# Patient Record
Sex: MALE | Race: WHITE | NOT HISPANIC OR LATINO | Employment: FULL TIME | URBAN - METROPOLITAN AREA
[De-identification: names, ages, dates, MRNs, and addresses within clinical notes are randomized per-mention and may not be internally consistent; named-entity substitution may affect disease eponyms.]

---

## 2023-06-19 PROBLEM — J01.90 ACUTE SINUSITIS: Status: ACTIVE | Noted: 2023-06-19

## 2023-06-19 PROBLEM — R93.89 ABNORMAL FINDING ON IMAGING: Status: ACTIVE | Noted: 2023-06-19

## 2023-06-19 PROBLEM — J06.9 VIRAL URI WITH COUGH: Status: RESOLVED | Noted: 2023-06-19 | Resolved: 2023-06-19

## 2023-06-19 PROBLEM — H91.93 BILATERAL HEARING LOSS: Status: ACTIVE | Noted: 2023-06-19

## 2023-06-19 PROBLEM — N52.9 ERECTILE DYSFUNCTION: Status: ACTIVE | Noted: 2023-06-19

## 2023-06-19 PROBLEM — G47.33 OBSTRUCTIVE SLEEP APNEA ON CPAP: Status: ACTIVE | Noted: 2023-06-19

## 2023-06-19 PROBLEM — K44.9 HIATAL HERNIA: Status: ACTIVE | Noted: 2023-06-19

## 2023-06-19 PROBLEM — E78.5 HYPERLIPEMIA: Status: ACTIVE | Noted: 2023-06-19

## 2023-06-19 PROBLEM — R53.83 FATIGUE: Status: ACTIVE | Noted: 2023-06-19

## 2023-06-19 PROBLEM — M25.50 POLYARTHRALGIA: Status: ACTIVE | Noted: 2023-06-19

## 2023-06-19 PROBLEM — M54.2 NECK PAIN: Status: ACTIVE | Noted: 2023-06-19

## 2023-06-19 PROBLEM — K64.8 PROLAPSED INTERNAL HEMORRHOIDS: Status: ACTIVE | Noted: 2023-06-19

## 2023-06-19 PROBLEM — Z00.00 ENCOUNTER FOR HEALTH MAINTENANCE EXAMINATION: Status: ACTIVE | Noted: 2023-06-19

## 2023-06-19 PROBLEM — L57.0 ACTINIC KERATOSIS: Status: ACTIVE | Noted: 2023-06-19

## 2023-06-19 PROBLEM — E55.9 VITAMIN D DEFICIENCY: Status: ACTIVE | Noted: 2023-06-19

## 2023-06-19 PROBLEM — K21.9 GERD (GASTROESOPHAGEAL REFLUX DISEASE): Status: ACTIVE | Noted: 2023-06-19

## 2023-06-19 PROBLEM — K20.90 ESOPHAGITIS: Status: ACTIVE | Noted: 2023-06-19

## 2023-06-19 PROBLEM — N40.0 BENIGN ENLARGEMENT OF PROSTATE: Status: ACTIVE | Noted: 2023-06-19

## 2023-06-19 PROBLEM — I10 HYPERTENSION: Status: ACTIVE | Noted: 2023-06-19

## 2023-06-19 PROBLEM — G43.109 OCULAR MIGRAINE: Status: ACTIVE | Noted: 2023-06-19

## 2023-06-19 PROBLEM — G47.00 INSOMNIA: Status: ACTIVE | Noted: 2023-06-19

## 2023-06-19 PROBLEM — H53.9 VISUAL DISTURBANCE: Status: ACTIVE | Noted: 2023-06-19

## 2023-06-19 PROBLEM — I48.91 A-FIB (MULTI): Status: ACTIVE | Noted: 2023-06-19

## 2023-06-19 PROBLEM — R10.13 ABDOMINAL PAIN, EPIGASTRIC: Status: ACTIVE | Noted: 2023-06-19

## 2023-06-19 PROBLEM — J30.89 ENVIRONMENTAL AND SEASONAL ALLERGIES: Status: ACTIVE | Noted: 2023-06-19

## 2023-06-19 PROBLEM — M47.816 DEGENERATIVE JOINT DISEASE (DJD) OF LUMBAR SPINE: Status: ACTIVE | Noted: 2023-06-19

## 2023-06-19 PROBLEM — K58.1 IRRITABLE BOWEL SYNDROME WITH PREDOMINANT CONSTIPATION: Status: ACTIVE | Noted: 2023-06-19

## 2023-06-19 PROBLEM — I48.20 ATRIAL FIBRILLATION, CHRONIC (MULTI): Status: ACTIVE | Noted: 2023-06-19

## 2023-06-19 PROBLEM — R41.3 MEMORY LOSS: Status: ACTIVE | Noted: 2023-06-19

## 2023-06-19 PROBLEM — Q37.9: Status: ACTIVE | Noted: 2023-06-19

## 2023-06-19 PROBLEM — H57.10 OCULAR PAIN: Status: ACTIVE | Noted: 2023-06-19

## 2023-06-19 PROBLEM — J30.9 ALLERGIC RHINITIS: Status: ACTIVE | Noted: 2023-06-19

## 2023-06-19 PROBLEM — R93.5 ABNORMAL CT OF THE ABDOMEN: Status: ACTIVE | Noted: 2023-06-19

## 2023-06-19 PROBLEM — I49.3 PVCS (PREMATURE VENTRICULAR CONTRACTIONS): Status: ACTIVE | Noted: 2023-06-19

## 2023-06-19 PROBLEM — K59.00 CONSTIPATION: Status: ACTIVE | Noted: 2023-06-19

## 2023-06-19 RX ORDER — SOTALOL HYDROCHLORIDE 120 MG/1
120 TABLET ORAL
COMMUNITY
Start: 2019-09-04 | End: 2023-06-20 | Stop reason: SDUPTHER

## 2023-06-19 RX ORDER — AMOXICILLIN 500 MG
1 CAPSULE ORAL DAILY
COMMUNITY

## 2023-06-19 RX ORDER — ERGOCALCIFEROL 1.25 MG/1
50000 CAPSULE ORAL WEEKLY
COMMUNITY
Start: 2019-12-08 | End: 2023-06-20 | Stop reason: SDUPTHER

## 2023-06-19 RX ORDER — OMEPRAZOLE 20 MG/1
20 TABLET, DELAYED RELEASE ORAL
COMMUNITY
Start: 2022-06-28

## 2023-06-19 RX ORDER — RIVAROXABAN 20 MG/1
20 TABLET, FILM COATED ORAL
COMMUNITY
End: 2023-06-19 | Stop reason: ENTERED-IN-ERROR

## 2023-06-19 RX ORDER — MULTIVITAMIN
1 TABLET ORAL DAILY
COMMUNITY

## 2023-06-19 RX ORDER — NAPROXEN SODIUM 220 MG/1
1 TABLET, FILM COATED ORAL DAILY
COMMUNITY
Start: 2019-12-30

## 2023-06-19 RX ORDER — FLUTICASONE PROPIONATE 50 MCG
1 SPRAY, SUSPENSION (ML) NASAL DAILY
COMMUNITY
Start: 2019-12-04

## 2023-06-19 RX ORDER — LISINOPRIL AND HYDROCHLOROTHIAZIDE 12.5; 2 MG/1; MG/1
1 TABLET ORAL DAILY
COMMUNITY
Start: 2019-09-04 | End: 2023-06-20 | Stop reason: SDUPTHER

## 2023-06-19 RX ORDER — DOXYCYCLINE HYCLATE 100 MG
100 TABLET ORAL 2 TIMES DAILY
COMMUNITY
End: 2023-12-05 | Stop reason: ALTCHOICE

## 2023-06-19 RX ORDER — TAMSULOSIN HYDROCHLORIDE 0.4 MG/1
1 CAPSULE ORAL DAILY
COMMUNITY
Start: 2019-09-04 | End: 2023-10-10 | Stop reason: SDUPTHER

## 2023-06-20 ENCOUNTER — OFFICE VISIT (OUTPATIENT)
Dept: PRIMARY CARE | Facility: CLINIC | Age: 73
End: 2023-06-20
Payer: MEDICARE

## 2023-06-20 VITALS
HEART RATE: 58 BPM | WEIGHT: 195 LBS | HEIGHT: 70 IN | DIASTOLIC BLOOD PRESSURE: 84 MMHG | SYSTOLIC BLOOD PRESSURE: 125 MMHG | BODY MASS INDEX: 27.92 KG/M2 | OXYGEN SATURATION: 98 %

## 2023-06-20 DIAGNOSIS — J30.89 ENVIRONMENTAL AND SEASONAL ALLERGIES: ICD-10-CM

## 2023-06-20 DIAGNOSIS — E55.9 VITAMIN D DEFICIENCY: ICD-10-CM

## 2023-06-20 DIAGNOSIS — E29.1 HYPOGONADISM MALE: ICD-10-CM

## 2023-06-20 DIAGNOSIS — Z00.00 ENCOUNTER FOR HEALTH MAINTENANCE EXAMINATION: Primary | ICD-10-CM

## 2023-06-20 DIAGNOSIS — I48.91 ATRIAL FIBRILLATION, UNSPECIFIED TYPE (MULTI): ICD-10-CM

## 2023-06-20 DIAGNOSIS — N52.9 ERECTILE DYSFUNCTION, UNSPECIFIED ERECTILE DYSFUNCTION TYPE: ICD-10-CM

## 2023-06-20 DIAGNOSIS — N40.0 BENIGN PROSTATIC HYPERPLASIA, UNSPECIFIED WHETHER LOWER URINARY TRACT SYMPTOMS PRESENT: ICD-10-CM

## 2023-06-20 DIAGNOSIS — I10 HYPERTENSION, UNSPECIFIED TYPE: ICD-10-CM

## 2023-06-20 DIAGNOSIS — K21.9 GASTROESOPHAGEAL REFLUX DISEASE, UNSPECIFIED WHETHER ESOPHAGITIS PRESENT: ICD-10-CM

## 2023-06-20 PROCEDURE — 99397 PER PM REEVAL EST PAT 65+ YR: CPT | Performed by: FAMILY MEDICINE

## 2023-06-20 PROCEDURE — 3079F DIAST BP 80-89 MM HG: CPT | Performed by: FAMILY MEDICINE

## 2023-06-20 PROCEDURE — 3074F SYST BP LT 130 MM HG: CPT | Performed by: FAMILY MEDICINE

## 2023-06-20 PROCEDURE — 99213 OFFICE O/P EST LOW 20 MIN: CPT | Performed by: FAMILY MEDICINE

## 2023-06-20 RX ORDER — ACETAMINOPHEN, DIPHENHYDRAMINE HCL, PHENYLEPHRINE HCL 325; 25; 5 MG/1; MG/1; MG/1
10 TABLET ORAL NIGHTLY
COMMUNITY

## 2023-06-20 RX ORDER — SOTALOL HYDROCHLORIDE 120 MG/1
120 TABLET ORAL EVERY 12 HOURS SCHEDULED
Qty: 180 TABLET | Refills: 2 | Status: SHIPPED | OUTPATIENT
Start: 2023-06-20 | End: 2023-12-05 | Stop reason: SDUPTHER

## 2023-06-20 RX ORDER — LISINOPRIL AND HYDROCHLOROTHIAZIDE 12.5; 2 MG/1; MG/1
1 TABLET ORAL DAILY
Qty: 90 TABLET | Refills: 2 | Status: SHIPPED | OUTPATIENT
Start: 2023-06-20 | End: 2023-12-05 | Stop reason: SDUPTHER

## 2023-06-20 RX ORDER — ERGOCALCIFEROL 1.25 MG/1
50000 CAPSULE ORAL
Qty: 4 CAPSULE | Refills: 2 | Status: SHIPPED | OUTPATIENT
Start: 2023-06-20 | End: 2023-12-05 | Stop reason: SDUPTHER

## 2023-06-20 ASSESSMENT — PATIENT HEALTH QUESTIONNAIRE - PHQ9
SUM OF ALL RESPONSES TO PHQ9 QUESTIONS 1 & 2: 0
2. FEELING DOWN, DEPRESSED OR HOPELESS: NOT AT ALL
1. LITTLE INTEREST OR PLEASURE IN DOING THINGS: NOT AT ALL

## 2023-06-20 NOTE — PROGRESS NOTES
Annual Comprehensive Medical Exam    73 y.o. male presents for annual comprehensive medical evaluation and preventive services screening.  No recent hospitalizations, surgeries or significant injuries.    HPI    Hemorrhoids:  Saw surgeon since last visit, who reportedly states that patient does not need surgery at this time and should continue to treat with suppositories and Citracal as needed.  Patient states that he is still occasionally having flare ups.     BLLE weakness: Reports weakness which he noticed beginning roughly 2 weeks ago.  States that he notices the weakness more at night when getting up to use the restroom.  Denies any falls.  States that weakness improves when he is more awake during the day.    BMI 27.98:  wants to lose weight.  Walking 3-4 times per week.  Using a calorie counting paco to stay under 1700 calories.  Has been doing this for a month and no weight loss as of yet.    Hx/a-fib and HTN:  Sees Cardiologist, Dr Barksdale.  Continues to take Sotalol and Lisinopril-hydrochlorothiazide.  Reports symptoms are well managed at this time.  Denies any chest pains, SOB, or palpations. Last ECG was 12/2022.    ED: Testosterone labs ordered.  Viagra has worked in the past but not effective at this time.  States that he can get an erection, but unable to maintain it.  States that his urologist is aware and has discussed the possibility of pump placement.    Seasonal Allergies:  Currently taking Flonase, and states this is effective for symptoms.      GERD:  Currently taking Prilosec and states this is effective for symptoms.  Denies burning, epigastric pain, or excessive belching.  States he does not like his current GI doctor and that he was referred to a different GI doctor at his last MM visit, but has not yet made an appointment.      Prevention:  Last colonoscopy completed 12/2021.  Next due in 2025.      Past Medical History:   Diagnosis Date    COVID-19     COVID-19 virus infection    Influenza  due to other identified influenza virus with other respiratory manifestations 01/16/2017    Influenza A    Personal history of other diseases of the digestive system 12/30/2019    History of umbilical hernia    Personal history of other malignant neoplasm of skin     History of other malignant neoplasm of skin    Viral URI with cough 06/19/2023      Past Surgical History:   Procedure Laterality Date    COLONOSCOPY  12/27/2021    Complete Colonoscopy    OTHER SURGICAL HISTORY  11/03/2016    Cath Stent Placement Number Of Stents Placed:    OTHER SURGICAL HISTORY  12/27/2021    Mohs surgery    OTHER SURGICAL HISTORY  09/06/2022    Esophagogastroduodenoscopy    OTHER SURGICAL HISTORY  02/07/2020    Umbilical hernia repair    TRANSURETHRAL RESECTION OF PROSTATE  10/20/2018    Transurethral Resection Of Prostate (TURP)     No family history on file.   Social History     Socioeconomic History    Marital status:      Spouse name: Not on file    Number of children: Not on file    Years of education: Not on file    Highest education level: Not on file   Occupational History    Not on file   Tobacco Use    Smoking status: Not on file    Smokeless tobacco: Not on file   Substance and Sexual Activity    Alcohol use: Not on file    Drug use: Not on file    Sexual activity: Not on file   Other Topics Concern    Not on file   Social History Narrative    Not on file     Social Determinants of Health     Financial Resource Strain: Not on file   Food Insecurity: Not on file   Transportation Needs: Not on file   Physical Activity: Not on file   Stress: Not on file   Social Connections: Not on file   Intimate Partner Violence: Not on file   Housing Stability: Not on file       Current Outpatient Medications on File Prior to Visit   Medication Sig Dispense Refill    aspirin 81 mg chewable tablet Chew 1 tablet (81 mg) once daily.      doxycycline (Vibra-Tabs) 100 mg tablet Take 1 tablet (100 mg) by mouth.      ergocalciferol  (Vitamin D-2) 1.25 MG (69941 UT) capsule Take 1 capsule (50,000 Units) by mouth once a week.      fluticasone (Flonase Allergy Relief) 50 mcg/actuation nasal spray Administer 1 spray into affected nostril(s) once daily.      lisinopriL-hydrochlorothiazide 20-12.5 mg tablet Take 1 tablet by mouth once daily.      multivitamin tablet Take 1 tablet by mouth once daily.      omega-3 fatty acids-fish oil 300-1,000 mg capsule Take 1 tablet by mouth once daily.      omeprazole OTC (PriLOSEC OTC) 20 mg EC tablet Take 1 tablet (20 mg) by mouth once daily in the morning. Take before meals.      sotalol (Betapace) 120 mg tablet Take 1 tablet (120 mg) by mouth.      tamsulosin (Flomax) 0.4 mg 24 hr capsule Take 1 capsule (0.4 mg) by mouth once daily.      [DISCONTINUED] Xarelto 20 mg tablet Take 1 tablet (20 mg) by mouth once daily in the evening. Take with meals.       No current facility-administered medications on file prior to visit.       Allergies   Allergen Reactions    Rosuvastatin Other    Simvastatin Other    Codeine Anxiety and Unknown     Nausea     Review of Systems:  Complete review of systems is negative today except for that mentioned in the history of present illness.  In particular patient denies chest pain, shortness of breath, headaches and GI disturbances.      There were no vitals taken for this visit.   Physical Exam  Gen: Alert and oriented ×3 male in no acute distress.  HEENT: Head is normocephalic.  Extraocular muscles are intact.  Tympanic membranes are clear.  Pharynx is clear.  Neck is supple without adenopathy or carotid bruits.  No masses or thyromegaly  Heart: Regular rate and rhythm without murmurs.  Lungs: Clear to auscultation bilaterally.  Abdomen: Soft with normal bowel sounds.  No masses or pain to palpation.  No bruits auscultated.  Extremities: Good range of motion of all joints.  No significant edema.  Neuro: No signs of focal neurologic deficit.  No tremor.  Speech and hearing are  normal.  DTRs +3/4;  Muscle Strength +5/5.  Musculoskeletal: Spine with good ROM.  No scoliosis.  Leg lengths are equal.  Skin: No significant or irregular nevi visualized.  Psych: normal affect.  No suicidal ideation.  Good judgement and insight.     The 10-year ASCVD risk score (Katie ADKINS, et al., 2019) is: 28.6%    Values used to calculate the score:      Age: 73 years      Sex: Male      Is Non- : No      Diabetic: No      Tobacco smoker: No      Systolic Blood Pressure: 125 mmHg      Is BP treated: Yes      HDL Cholesterol: 41 mg/dL      Total Cholesterol: 191 mg/dL       DIAGNOSIS/PLAN    1. Encounter for health maintenance examination  - Comprehensive Metabolic Panel; Future  - CBC; Future  - Lipid Panel; Future  - TSH with reflex to Free T4 if abnormal; Future  - Testosterone; Future  - Prostate Specific Antigen; Future  - Vitamin D 1,25 Dihydroxy; Future  - Urinalysis with Reflex Microscopic; Future    2. Hypertension, unspecified type  - Comprehensive Metabolic Panel; Future  - lisinopriL-hydrochlorothiazide 20-12.5 mg tablet; Take 1 tablet by mouth once daily.  Dispense: 90 tablet; Refill: 2  -Continue to follow up with Dr Barksdale    3. Erectile dysfunction, unspecified erectile dysfunction type  -Follow up with Urologist, Dr. Canchola   -Testosterone lab ordered.    -Discussed possibility of starting Testosterone hormone therapy after reviewing lab result    4. Atrial fibrillation, unspecified type (CMS/HCC)  - sotalol (Betapace) 120 mg tablet; Take 1 tablet (120 mg) by mouth every 12 hours.  Dispense: 180 tablet; Refill: 2  -Continue to follow up with Dr Barksdale    5. Benign prostatic hyperplasia, unspecified whether lower urinary tract symptoms present  - Prostate Specific Antigen; Future  -Continue to to follow with Dr Canchola    6. Vitamin D deficiency  - Vitamin D 1,25 Dihydroxy; Future  - ergocalciferol (Vitamin D-2) 1.25 MG (72123 UT) capsule; Take 1 capsule (50,000 Units) by mouth  1 (one) time per week.  Dispense: 4 capsule; Refill: 2    7. Environmental and seasonal allergies  -Continue with Flonase    8. Gastroesophageal reflux disease, unspecified whether esophagitis present  -Continue to take Prilosec for symptoms   -Make a follow up appt with GI to address any further GI concerns.    9. Hypogonadism male  - Testosterone; Future         Discussed inappropriateness of multiple questions when scheduled for an annual health maintenance exam.  In future, patient is aware that separate appointments for acute issues can be made with staff and that non-urgent issues will be discussed at a future appointment    Follow up in 6 month for medical management.  Advised patient that he could call the office sooner to make earlier follow up if needed.      I will continue to monitor, evaluate, assess and treat all problems/diagnoses as appropriate and continue to collaborate with specialists.    Contact office or send a  [employer] message with any questions or concerns    Encouraged to sign up with  [employer]  Patient will only be notified of labs that require medical intervention.    Prescriptions will not be filled unless you are compliant with your follow up appointments or have a follow up appointment scheduled as per instruction of your physician. Refills should be requested at the time of your visit.    **Charting was completed using voice recognition technology and may include unintended errors**    Maurisio Combs DO, SANDRA  Senior Attending Physician  [facility]  [address]  [place] [address] [phone]           Maurisio Combs DO, FACOFP

## 2023-07-13 ENCOUNTER — LAB (OUTPATIENT)
Dept: LAB | Facility: LAB | Age: 73
End: 2023-07-13
Payer: MEDICARE

## 2023-07-13 DIAGNOSIS — N40.0 BENIGN PROSTATIC HYPERPLASIA, UNSPECIFIED WHETHER LOWER URINARY TRACT SYMPTOMS PRESENT: ICD-10-CM

## 2023-07-13 DIAGNOSIS — E29.1 HYPOGONADISM MALE: ICD-10-CM

## 2023-07-13 DIAGNOSIS — Z00.00 ENCOUNTER FOR HEALTH MAINTENANCE EXAMINATION: ICD-10-CM

## 2023-07-13 DIAGNOSIS — I10 HYPERTENSION, UNSPECIFIED TYPE: ICD-10-CM

## 2023-07-13 DIAGNOSIS — E55.9 VITAMIN D DEFICIENCY: ICD-10-CM

## 2023-07-13 LAB
ALANINE AMINOTRANSFERASE (SGPT) (U/L) IN SER/PLAS: 27 U/L (ref 10–52)
ALBUMIN (G/DL) IN SER/PLAS: 4.2 G/DL (ref 3.4–5)
ALKALINE PHOSPHATASE (U/L) IN SER/PLAS: 67 U/L (ref 33–136)
ANION GAP IN SER/PLAS: 12 MMOL/L (ref 10–20)
APPEARANCE, URINE: CLEAR
ASPARTATE AMINOTRANSFERASE (SGOT) (U/L) IN SER/PLAS: 27 U/L (ref 9–39)
BILIRUBIN TOTAL (MG/DL) IN SER/PLAS: 0.6 MG/DL (ref 0–1.2)
BILIRUBIN, URINE: NEGATIVE
BLOOD, URINE: NEGATIVE
CALCIUM (MG/DL) IN SER/PLAS: 9.6 MG/DL (ref 8.6–10.3)
CARBON DIOXIDE, TOTAL (MMOL/L) IN SER/PLAS: 26 MMOL/L (ref 21–32)
CHLORIDE (MMOL/L) IN SER/PLAS: 104 MMOL/L (ref 98–107)
CHOLESTEROL (MG/DL) IN SER/PLAS: 202 MG/DL (ref 0–199)
CHOLESTEROL IN HDL (MG/DL) IN SER/PLAS: 41 MG/DL
CHOLESTEROL/HDL RATIO: 4.9
COLOR, URINE: YELLOW
CREATININE (MG/DL) IN SER/PLAS: 0.91 MG/DL (ref 0.5–1.3)
ERYTHROCYTE DISTRIBUTION WIDTH (RATIO) BY AUTOMATED COUNT: 13.3 % (ref 11.5–14.5)
ERYTHROCYTE MEAN CORPUSCULAR HEMOGLOBIN CONCENTRATION (G/DL) BY AUTOMATED: 33.8 G/DL (ref 32–36)
ERYTHROCYTE MEAN CORPUSCULAR VOLUME (FL) BY AUTOMATED COUNT: 90 FL (ref 80–100)
ERYTHROCYTES (10*6/UL) IN BLOOD BY AUTOMATED COUNT: 5.17 X10E12/L (ref 4.5–5.9)
GFR MALE: 88 ML/MIN/1.73M2
GLUCOSE (MG/DL) IN SER/PLAS: 103 MG/DL (ref 74–99)
GLUCOSE, URINE: NEGATIVE MG/DL
HEMATOCRIT (%) IN BLOOD BY AUTOMATED COUNT: 46.4 % (ref 41–52)
HEMOGLOBIN (G/DL) IN BLOOD: 15.7 G/DL (ref 13.5–17.5)
KETONES, URINE: NEGATIVE MG/DL
LDL: 104 MG/DL (ref 0–99)
LEUKOCYTE ESTERASE, URINE: NEGATIVE
LEUKOCYTES (10*3/UL) IN BLOOD BY AUTOMATED COUNT: 5.7 X10E9/L (ref 4.4–11.3)
NITRITE, URINE: NEGATIVE
NON HDL CHOLESTEROL: 161 MG/DL
PH, URINE: 6 (ref 5–8)
PLATELETS (10*3/UL) IN BLOOD AUTOMATED COUNT: 187 X10E9/L (ref 150–450)
POTASSIUM (MMOL/L) IN SER/PLAS: 3.9 MMOL/L (ref 3.5–5.3)
PROSTATE SPECIFIC AG (NG/ML) IN SER/PLAS: 0.91 NG/ML (ref 0–4)
PROTEIN TOTAL: 7 G/DL (ref 6.4–8.2)
PROTEIN, URINE: NEGATIVE MG/DL
SODIUM (MMOL/L) IN SER/PLAS: 138 MMOL/L (ref 136–145)
SPECIFIC GRAVITY, URINE: 1.01 (ref 1–1.03)
THYROTROPIN (MIU/L) IN SER/PLAS BY DETECTION LIMIT <= 0.05 MIU/L: 2.39 MIU/L (ref 0.44–3.98)
TRIGLYCERIDE (MG/DL) IN SER/PLAS: 287 MG/DL (ref 0–149)
UREA NITROGEN (MG/DL) IN SER/PLAS: 24 MG/DL (ref 6–23)
UROBILINOGEN, URINE: <2 MG/DL (ref 0–1.9)
VLDL: 57 MG/DL (ref 0–40)

## 2023-07-13 PROCEDURE — 82652 VIT D 1 25-DIHYDROXY: CPT

## 2023-07-13 PROCEDURE — 36415 COLL VENOUS BLD VENIPUNCTURE: CPT

## 2023-07-13 PROCEDURE — 85027 COMPLETE CBC AUTOMATED: CPT

## 2023-07-13 PROCEDURE — 84153 ASSAY OF PSA TOTAL: CPT

## 2023-07-13 PROCEDURE — 84443 ASSAY THYROID STIM HORMONE: CPT

## 2023-07-13 PROCEDURE — 80061 LIPID PANEL: CPT

## 2023-07-13 PROCEDURE — 84403 ASSAY OF TOTAL TESTOSTERONE: CPT

## 2023-07-13 PROCEDURE — 81003 URINALYSIS AUTO W/O SCOPE: CPT

## 2023-07-13 PROCEDURE — 80053 COMPREHEN METABOLIC PANEL: CPT

## 2023-07-14 LAB — TESTOSTERONE (NG/DL) IN SER/PLAS: 447 NG/DL (ref 240–1000)

## 2023-07-17 LAB — VITAMIN D 1,25-DIHYDROXY: 65.3 PG/ML (ref 19.9–79.3)

## 2023-10-01 DIAGNOSIS — N40.0 BENIGN PROSTATIC HYPERPLASIA WITHOUT LOWER URINARY TRACT SYMPTOMS: ICD-10-CM

## 2023-10-02 RX ORDER — TAMSULOSIN HYDROCHLORIDE 0.4 MG/1
0.4 CAPSULE ORAL DAILY
Qty: 90 CAPSULE | Refills: 1 | OUTPATIENT
Start: 2023-10-02

## 2023-10-09 ENCOUNTER — TELEPHONE (OUTPATIENT)
Dept: PRIMARY CARE | Facility: CLINIC | Age: 73
End: 2023-10-09
Payer: MEDICARE

## 2023-10-09 DIAGNOSIS — N40.0 BENIGN PROSTATIC HYPERPLASIA, UNSPECIFIED WHETHER LOWER URINARY TRACT SYMPTOMS PRESENT: Primary | ICD-10-CM

## 2023-10-09 NOTE — TELEPHONE ENCOUNTER
REFILL REQUEST    Med: Tamsulosin   Med Dose: 0.4 mg  Med Frequency: one cap daily    Pharmacy: [facility]  Pharmacy Address: [id number] [place] [address] in [place]    LR: 12/28/2022  LV: 06/20/2023  NV: 12/05/2023

## 2023-10-10 RX ORDER — TAMSULOSIN HYDROCHLORIDE 0.4 MG/1
0.4 CAPSULE ORAL DAILY
Qty: 90 CAPSULE | Refills: 0 | Status: SHIPPED | OUTPATIENT
Start: 2023-10-10 | End: 2023-12-05 | Stop reason: SDUPTHER

## 2023-12-05 ENCOUNTER — OFFICE VISIT (OUTPATIENT)
Dept: PRIMARY CARE | Facility: CLINIC | Age: 73
End: 2023-12-05
Payer: MEDICARE

## 2023-12-05 VITALS
HEIGHT: 70 IN | DIASTOLIC BLOOD PRESSURE: 79 MMHG | HEART RATE: 58 BPM | OXYGEN SATURATION: 96 % | SYSTOLIC BLOOD PRESSURE: 120 MMHG | WEIGHT: 188 LBS | BODY MASS INDEX: 26.92 KG/M2

## 2023-12-05 DIAGNOSIS — I48.91 ATRIAL FIBRILLATION, UNSPECIFIED TYPE (MULTI): ICD-10-CM

## 2023-12-05 DIAGNOSIS — K21.9 GASTROESOPHAGEAL REFLUX DISEASE WITHOUT ESOPHAGITIS: ICD-10-CM

## 2023-12-05 DIAGNOSIS — Z00.00 MEDICARE ANNUAL WELLNESS VISIT, SUBSEQUENT: ICD-10-CM

## 2023-12-05 DIAGNOSIS — E55.9 VITAMIN D DEFICIENCY: ICD-10-CM

## 2023-12-05 DIAGNOSIS — R39.14 BENIGN PROSTATIC HYPERPLASIA WITH INCOMPLETE BLADDER EMPTYING: ICD-10-CM

## 2023-12-05 DIAGNOSIS — J30.1 NON-SEASONAL ALLERGIC RHINITIS DUE TO POLLEN: ICD-10-CM

## 2023-12-05 DIAGNOSIS — G43.109 OCULAR MIGRAINE: ICD-10-CM

## 2023-12-05 DIAGNOSIS — N40.1 BENIGN PROSTATIC HYPERPLASIA WITH INCOMPLETE BLADDER EMPTYING: ICD-10-CM

## 2023-12-05 DIAGNOSIS — E78.5 HYPERLIPIDEMIA, UNSPECIFIED HYPERLIPIDEMIA TYPE: ICD-10-CM

## 2023-12-05 DIAGNOSIS — J30.89 ENVIRONMENTAL AND SEASONAL ALLERGIES: ICD-10-CM

## 2023-12-05 DIAGNOSIS — I10 HYPERTENSION, UNSPECIFIED TYPE: Primary | ICD-10-CM

## 2023-12-05 DIAGNOSIS — K58.1 IRRITABLE BOWEL SYNDROME WITH PREDOMINANT CONSTIPATION: ICD-10-CM

## 2023-12-05 PROBLEM — I48.20 ATRIAL FIBRILLATION, CHRONIC (MULTI): Status: RESOLVED | Noted: 2023-06-19 | Resolved: 2023-12-05

## 2023-12-05 PROCEDURE — 1159F MED LIST DOCD IN RCRD: CPT | Performed by: FAMILY MEDICINE

## 2023-12-05 PROCEDURE — G0439 PPPS, SUBSEQ VISIT: HCPCS | Performed by: FAMILY MEDICINE

## 2023-12-05 PROCEDURE — 1160F RVW MEDS BY RX/DR IN RCRD: CPT | Performed by: FAMILY MEDICINE

## 2023-12-05 PROCEDURE — 99497 ADVNCD CARE PLAN 30 MIN: CPT | Performed by: FAMILY MEDICINE

## 2023-12-05 PROCEDURE — G0446 INTENS BEHAVE THER CARDIO DX: HCPCS | Performed by: FAMILY MEDICINE

## 2023-12-05 PROCEDURE — 1036F TOBACCO NON-USER: CPT | Performed by: FAMILY MEDICINE

## 2023-12-05 PROCEDURE — 99214 OFFICE O/P EST MOD 30 MIN: CPT | Performed by: FAMILY MEDICINE

## 2023-12-05 PROCEDURE — 3078F DIAST BP <80 MM HG: CPT | Performed by: FAMILY MEDICINE

## 2023-12-05 PROCEDURE — 1170F FXNL STATUS ASSESSED: CPT | Performed by: FAMILY MEDICINE

## 2023-12-05 PROCEDURE — 3074F SYST BP LT 130 MM HG: CPT | Performed by: FAMILY MEDICINE

## 2023-12-05 RX ORDER — ERGOCALCIFEROL 1.25 MG/1
50000 CAPSULE ORAL
Qty: 12 CAPSULE | Refills: 2 | Status: SHIPPED | OUTPATIENT
Start: 2023-12-05

## 2023-12-05 RX ORDER — SOTALOL HYDROCHLORIDE 120 MG/1
120 TABLET ORAL EVERY 12 HOURS SCHEDULED
Qty: 180 TABLET | Refills: 2 | Status: SHIPPED | OUTPATIENT
Start: 2023-12-05

## 2023-12-05 RX ORDER — TAMSULOSIN HYDROCHLORIDE 0.4 MG/1
0.4 CAPSULE ORAL DAILY
Qty: 90 CAPSULE | Refills: 2 | Status: SHIPPED | OUTPATIENT
Start: 2023-12-05

## 2023-12-05 RX ORDER — LISINOPRIL AND HYDROCHLOROTHIAZIDE 12.5; 2 MG/1; MG/1
1 TABLET ORAL DAILY
Qty: 90 TABLET | Refills: 2 | Status: SHIPPED | OUTPATIENT
Start: 2023-12-05

## 2023-12-05 ASSESSMENT — ACTIVITIES OF DAILY LIVING (ADL)
BATHING: INDEPENDENT
DOING_HOUSEWORK: INDEPENDENT
GROCERY_SHOPPING: INDEPENDENT
DRESSING: INDEPENDENT
MANAGING_FINANCES: INDEPENDENT
TAKING_MEDICATION: INDEPENDENT

## 2023-12-05 ASSESSMENT — PATIENT HEALTH QUESTIONNAIRE - PHQ9
2. FEELING DOWN, DEPRESSED OR HOPELESS: NOT AT ALL
1. LITTLE INTEREST OR PLEASURE IN DOING THINGS: NOT AT ALL
SUM OF ALL RESPONSES TO PHQ9 QUESTIONS 1 AND 2: 0

## 2023-12-05 NOTE — PROGRESS NOTES
General Medical Management Note    73 y.o. male presents for Medical Management  HPI    No longer has Atrial Fibrillation since ablation by Dr. Apolonia patel-Covid.    BPH managed with Flomax    HTN controlled    Perennial allergies controlled with medications OTC    IBS w Constipation - metamucil and Miralax    Low back arthritis - rarely a problem as long as doing Ceasar Exercises daily    HLD - statin caused myalgia.  Has been exercising daily for past 2 months.    Ocular migraine- 1 in July, 2 in past month.    Past Medical History:   Diagnosis Date    COVID-19     COVID-19 virus infection    Influenza due to other identified influenza virus with other respiratory manifestations 01/16/2017    Influenza A    Personal history of other diseases of the digestive system 12/30/2019    History of umbilical hernia    Personal history of other malignant neoplasm of skin     History of other malignant neoplasm of skin    Viral URI with cough 06/19/2023      Past Surgical History:   Procedure Laterality Date    COLONOSCOPY  12/27/2021    Complete Colonoscopy    OTHER SURGICAL HISTORY  11/03/2016    Cath Stent Placement Number Of Stents Placed:    OTHER SURGICAL HISTORY  12/27/2021    Mohs surgery    OTHER SURGICAL HISTORY  09/06/2022    Esophagogastroduodenoscopy    OTHER SURGICAL HISTORY  02/07/2020    Umbilical hernia repair    TRANSURETHRAL RESECTION OF PROSTATE  10/20/2018    Transurethral Resection Of Prostate (TURP)     No family history on file.   Social History     Socioeconomic History    Marital status:      Spouse name: Not on file    Number of children: Not on file    Years of education: Not on file    Highest education level: Not on file   Occupational History    Not on file   Tobacco Use    Smoking status: Never    Smokeless tobacco: Never   Substance and Sexual Activity    Alcohol use: Not on file    Drug use: Never    Sexual activity: Not on file   Other Topics Concern    Not on file   Social History  "Narrative    Not on file     Social Determinants of Health     Financial Resource Strain: Not on file   Food Insecurity: Not on file   Transportation Needs: Not on file   Physical Activity: Not on file   Stress: Not on file   Social Connections: Not on file   Intimate Partner Violence: Not on file   Housing Stability: Not on file       Current Outpatient Medications on File Prior to Visit   Medication Sig Dispense Refill    aspirin 81 mg chewable tablet Chew 1 tablet (81 mg) once daily.      ergocalciferol (Vitamin D-2) 1.25 MG (02323 UT) capsule Take 1 capsule (50,000 Units) by mouth 1 (one) time per week. 4 capsule 2    fluticasone (Flonase Allergy Relief) 50 mcg/actuation nasal spray Administer 1 spray into affected nostril(s) once daily.      lisinopriL-hydrochlorothiazide 20-12.5 mg tablet Take 1 tablet by mouth once daily. 90 tablet 2    melatonin 10 mg tablet Take 1 tablet (10 mg) by mouth once daily at bedtime.      multivitamin tablet Take 1 tablet by mouth once daily.      omega-3 fatty acids-fish oil 300-1,000 mg capsule Take 1 tablet by mouth once daily.      omeprazole OTC (PriLOSEC OTC) 20 mg EC tablet Take 1 tablet (20 mg) by mouth once daily in the morning. Take before meals.      sotalol (Betapace) 120 mg tablet Take 1 tablet (120 mg) by mouth every 12 hours. 180 tablet 2    tamsulosin (Flomax) 0.4 mg 24 hr capsule Take 1 capsule (0.4 mg) by mouth once daily. 90 capsule 0    doxycycline (Vibra-Tabs) 100 mg tablet Take 1 tablet (100 mg) by mouth 2 times a day.       No current facility-administered medications on file prior to visit.       Allergies   Allergen Reactions    Famotidine Other    Rosuvastatin Other    Simvastatin Other    Codeine Anxiety and Unknown     Nausea         ROS: Denies chest pain, SOB, Headache, GI problems     Visit Vitals  /79   Pulse 58   Ht 1.778 m (5' 10\")   Wt 85.3 kg (188 lb)   SpO2 96%   BMI 26.98 kg/m²   Smoking Status Never   BSA 2.05 m²        PHYSICAL " EXAM:  Alert and oriented x3.  Eyes: EOM grossly intact  Neck supple without lymph adenopathy or carotid bruit.  No masses or thyromegaly  Heart regular rate and rhythm without murmur.  Lungs clear to auscultation.  Legs without edema.  Gait is non-antalgic  Speech clear.  Hearing adequate.      The 10-year ASCVD risk score (Katie ADKINS, et al., 2019) is: 27.4%    Values used to calculate the score:      Age: 73 years      Sex: Male      Is Non- : No      Diabetic: No      Tobacco smoker: No      Systolic Blood Pressure: 120 mmHg      Is BP treated: Yes      HDL Cholesterol: 41 mg/dL      Total Cholesterol: 202 mg/dL      DIAGNOSIS/PLAN:    1. Hypertension, unspecified type  - Comprehensive Metabolic Panel; Future  - lisinopriL-hydrochlorothiazide 20-12.5 mg tablet; Take 1 tablet by mouth once daily.  Dispense: 90 tablet; Refill: 2    2. Atrial fibrillation, unspecified type (CMS/HCC)  Managed by Dr. Dave Barksdale  - sotalol (Betapace) 120 mg tablet; Take 1 tablet (120 mg) by mouth every 12 hours.  Dispense: 180 tablet; Refill: 2    3. Irritable bowel syndrome with predominant constipation  Continue Metamucil and MiraLAX    5. Environmental and seasonal allergies  Continue over-the-counter medication    6. Gastroesophageal reflux disease without esophagitis  Continue over-the-counter Prilosec    7. Vitamin D deficiency  - ergocalciferol (Vitamin D-2) 1.25 MG (31655 UT) capsule; Take 1 capsule (50,000 Units) by mouth 1 (one) time per week.  Dispense: 12 capsule; Refill: 2    8. Medicare annual wellness visit, subsequent  Living Will / Advanced Care Planning:  Discussed advance care planning including explanation and discussion of advanced directives.  Patient does not have current up-to-date documents.   Examples and information provided on how to create both living will and power of .  Patient was encouraged to work on completing these documents.      9. Benign prostatic hyperplasia,  "unspecified whether lower urinary tract symptoms present  - tamsulosin (Flomax) 0.4 mg 24 hr capsule; Take 1 capsule (0.4 mg) by mouth once daily.  Dispense: 90 capsule; Refill: 2    10. Non-seasonal allergic rhinitis due to pollen  Continue over-the-counter medication    11. Hyperlipidemia, unspecified hyperlipidemia type  Intolerant of statin medication.  Has been exercising daily for the past 2 months.  - Lipid Panel; Future      Return to office in 6 months for comprehensive medical evaluation, long-term medication use monitoring, and preventative services screening    Will continue to monitor, evaluate, assess and treat all problems/diagnoses as appropriate and continue to collaborate with specialists.    Encouraged to sign up with  [employer]    Contact office or send a  [employer] message with any questions or concerns    Patient will only be notified of labs that require medical intervention.    Prescriptions will not be filled unless you are compliant with your follow up appointments or have a follow up appointment scheduled as per instruction of your physician. Refills should be requested at the time of your visit.    **Charting was completed using voice recognition technology and may include unintended errors**    Maurisio Combs DO, FACOFP  Senior Attending Physician  [facility]  [address]  [place] [id number] [phone]   Maurisio Combs DO, FACOFP    Subjective   Reason for Visit: Rom Prince is an 73 y.o. male here for a Medicare Wellness visit.          Reviewed all medications by prescribing practitioner or clinical pharmacist (such as prescriptions, OTCs, herbal therapies and supplements) and documented in the medical record.    HPI    Patient Care Team:  Maurisio Combs DO as PCP - General     Review of Systems    Objective   Vitals:  /79   Pulse 58   Ht 1.778 m (5' 10\")   Wt 85.3 kg (188 lb)   SpO2 96%   BMI 26.98 kg/m²       Physical " Exam    Assessment/Plan   Problem List Items Addressed This Visit       Hypertension    Relevant Orders    Comprehensive Metabolic Panel     Other Visit Diagnoses       Routine general medical examination at health care facility    -  Primary

## 2023-12-06 ENCOUNTER — LAB (OUTPATIENT)
Dept: LAB | Facility: LAB | Age: 73
End: 2023-12-06
Payer: MEDICARE

## 2023-12-06 DIAGNOSIS — I10 HYPERTENSION, UNSPECIFIED TYPE: ICD-10-CM

## 2023-12-06 DIAGNOSIS — E78.5 HYPERLIPIDEMIA, UNSPECIFIED HYPERLIPIDEMIA TYPE: ICD-10-CM

## 2023-12-06 LAB
ALBUMIN SERPL BCP-MCNC: 4 G/DL (ref 3.4–5)
ALP SERPL-CCNC: 63 U/L (ref 33–136)
ALT SERPL W P-5'-P-CCNC: 22 U/L (ref 10–52)
ANION GAP SERPL CALC-SCNC: 12 MMOL/L (ref 10–20)
AST SERPL W P-5'-P-CCNC: 23 U/L (ref 9–39)
BILIRUB SERPL-MCNC: 0.6 MG/DL (ref 0–1.2)
BUN SERPL-MCNC: 26 MG/DL (ref 6–23)
CALCIUM SERPL-MCNC: 9.4 MG/DL (ref 8.6–10.3)
CHLORIDE SERPL-SCNC: 102 MMOL/L (ref 98–107)
CHOLEST SERPL-MCNC: 171 MG/DL (ref 0–199)
CHOLESTEROL/HDL RATIO: 4.4
CO2 SERPL-SCNC: 31 MMOL/L (ref 21–32)
CREAT SERPL-MCNC: 1.03 MG/DL (ref 0.5–1.3)
GFR SERPL CREATININE-BSD FRML MDRD: 76 ML/MIN/1.73M*2
GLUCOSE SERPL-MCNC: 94 MG/DL (ref 74–99)
HDLC SERPL-MCNC: 38.8 MG/DL
LDLC SERPL CALC-MCNC: 107 MG/DL
NON HDL CHOLESTEROL: 132 MG/DL (ref 0–149)
POTASSIUM SERPL-SCNC: 3.9 MMOL/L (ref 3.5–5.3)
PROT SERPL-MCNC: 6.5 G/DL (ref 6.4–8.2)
SODIUM SERPL-SCNC: 141 MMOL/L (ref 136–145)
TRIGL SERPL-MCNC: 128 MG/DL (ref 0–149)
VLDL: 26 MG/DL (ref 0–40)

## 2023-12-06 PROCEDURE — 80053 COMPREHEN METABOLIC PANEL: CPT

## 2023-12-06 PROCEDURE — 36415 COLL VENOUS BLD VENIPUNCTURE: CPT

## 2023-12-06 PROCEDURE — 80061 LIPID PANEL: CPT

## 2023-12-20 ENCOUNTER — APPOINTMENT (OUTPATIENT)
Dept: PRIMARY CARE | Facility: CLINIC | Age: 73
End: 2023-12-20
Payer: MEDICARE

## 2024-01-23 DIAGNOSIS — G43.B0 OPHTHALMOPLEGIC MIGRAINE, NOT INTRACTABLE: Primary | ICD-10-CM

## 2024-01-23 RX ORDER — TOPIRAMATE 25 MG/1
25 TABLET ORAL 2 TIMES DAILY
Qty: 60 TABLET | Refills: 5 | Status: SHIPPED | OUTPATIENT
Start: 2024-01-23 | End: 2024-07-21

## 2024-05-07 ENCOUNTER — OFFICE VISIT (OUTPATIENT)
Dept: PRIMARY CARE | Facility: CLINIC | Age: 74
End: 2024-05-07
Payer: MEDICARE

## 2024-05-07 VITALS
HEIGHT: 70 IN | DIASTOLIC BLOOD PRESSURE: 70 MMHG | OXYGEN SATURATION: 98 % | HEART RATE: 54 BPM | WEIGHT: 182 LBS | SYSTOLIC BLOOD PRESSURE: 126 MMHG | BODY MASS INDEX: 26.05 KG/M2

## 2024-05-07 DIAGNOSIS — J32.9 BACTERIAL SINUSITIS: Primary | ICD-10-CM

## 2024-05-07 DIAGNOSIS — B96.89 BACTERIAL SINUSITIS: Primary | ICD-10-CM

## 2024-05-07 PROCEDURE — 1036F TOBACCO NON-USER: CPT | Performed by: NURSE PRACTITIONER

## 2024-05-07 PROCEDURE — 1159F MED LIST DOCD IN RCRD: CPT | Performed by: NURSE PRACTITIONER

## 2024-05-07 PROCEDURE — 3078F DIAST BP <80 MM HG: CPT | Performed by: NURSE PRACTITIONER

## 2024-05-07 PROCEDURE — 1160F RVW MEDS BY RX/DR IN RCRD: CPT | Performed by: NURSE PRACTITIONER

## 2024-05-07 PROCEDURE — 3074F SYST BP LT 130 MM HG: CPT | Performed by: NURSE PRACTITIONER

## 2024-05-07 PROCEDURE — 99213 OFFICE O/P EST LOW 20 MIN: CPT | Performed by: NURSE PRACTITIONER

## 2024-05-07 RX ORDER — LORATADINE 10 MG/1
10 TABLET ORAL DAILY
COMMUNITY

## 2024-05-07 RX ORDER — AMOXICILLIN AND CLAVULANATE POTASSIUM 875; 125 MG/1; MG/1
875 TABLET, FILM COATED ORAL 2 TIMES DAILY
Qty: 20 TABLET | Refills: 0 | Status: SHIPPED | OUTPATIENT
Start: 2024-05-07 | End: 2024-05-17

## 2024-05-07 NOTE — PROGRESS NOTES
"Subjective   Patient ID: Rom Prince is a 74 y.o. male who presents for possible sinus infection.    HPI     Started out with a cold a few weeks ago which seem to resolve but now getting worse    Reports the following symptoms for      Headache/Sinus pressure: yes  Fever/chills: denies  Nasal congestion/post nasal drip: yes, yellow drainage  Cough: yes   Sputum: denies  Ear pain/pressure: denies  Sore throat: scratchy  Shortness of breath: denies  Drinking to stay hydrated: yes    Nicotine use: denies    Patient has taken:   Claritin  Motrin  Flonase      Review of Systems    Objective   /70   Pulse 54   Ht 1.778 m (5' 10\")   Wt 82.6 kg (182 lb)   SpO2 98%   BMI 26.11 kg/m²     Physical Exam    Alert and oriented x 3  Appears ill  Does not appear/sound dyspneic with conversation  Lungs clear t/o  Neck: neg lymphadenopathy  Speech clear.    Psych: Normal affect. Good judgment and insight.       Assessment/Plan     1. Bacterial sinusitis    Complete prescribed antibiotics as directed. Eat yogurt or take a probiotic (not within the hour of taking the antibiotic) while taking antibiotics.   Increase fluid intake throughout the day.    Irrigate your nose with saline spray 2-4 times per day.   Antihistamine nasal sprays (like Azelastine) also help with nasal congestion and sinus infection. Side effects of Azelastine include an occasional bitter taste. You can reduce the bitter taste by leaning forward, directing the spray toward the outside of your nose, and not sniffing for a few minutes.    Mucinex  DM for cough  Contact the office if not improving after completing the antibiotics.     - amoxicillin-pot clavulanate (Augmentin) 875-125 mg tablet; Take 1 tablet (875 mg) by mouth 2 times a day for 10 days.  Dispense: 20 tablet; Refill: 0       "

## 2024-06-25 ENCOUNTER — APPOINTMENT (OUTPATIENT)
Dept: PRIMARY CARE | Facility: CLINIC | Age: 74
End: 2024-06-25
Payer: MEDICARE

## 2024-06-25 VITALS
OXYGEN SATURATION: 96 % | BODY MASS INDEX: 28.41 KG/M2 | HEIGHT: 67 IN | WEIGHT: 181 LBS | DIASTOLIC BLOOD PRESSURE: 86 MMHG | SYSTOLIC BLOOD PRESSURE: 127 MMHG | HEART RATE: 58 BPM

## 2024-06-25 DIAGNOSIS — N40.0 BENIGN PROSTATIC HYPERPLASIA, UNSPECIFIED WHETHER LOWER URINARY TRACT SYMPTOMS PRESENT: ICD-10-CM

## 2024-06-25 DIAGNOSIS — J30.1 NON-SEASONAL ALLERGIC RHINITIS DUE TO POLLEN: ICD-10-CM

## 2024-06-25 DIAGNOSIS — R39.198 SLOWING OF URINARY STREAM: ICD-10-CM

## 2024-06-25 DIAGNOSIS — E29.1 HYPOGONADISM MALE: ICD-10-CM

## 2024-06-25 DIAGNOSIS — I10 HYPERTENSION, UNSPECIFIED TYPE: Primary | ICD-10-CM

## 2024-06-25 DIAGNOSIS — K58.1 IRRITABLE BOWEL SYNDROME WITH PREDOMINANT CONSTIPATION: ICD-10-CM

## 2024-06-25 DIAGNOSIS — E55.9 VITAMIN D DEFICIENCY: ICD-10-CM

## 2024-06-25 DIAGNOSIS — E78.5 HYPERLIPIDEMIA, UNSPECIFIED HYPERLIPIDEMIA TYPE: ICD-10-CM

## 2024-06-25 DIAGNOSIS — G47.33 OSA ON CPAP: ICD-10-CM

## 2024-06-25 RX ORDER — TAMSULOSIN HYDROCHLORIDE 0.4 MG/1
0.4 CAPSULE ORAL DAILY
Qty: 90 CAPSULE | Refills: 2 | Status: SHIPPED | OUTPATIENT
Start: 2024-06-25

## 2024-06-25 RX ORDER — FLUTICASONE PROPIONATE 50 MCG
1 SPRAY, SUSPENSION (ML) NASAL DAILY
Qty: 16 G | Refills: 11 | Status: SHIPPED | OUTPATIENT
Start: 2024-06-25

## 2024-06-25 RX ORDER — ERGOCALCIFEROL 1.25 MG/1
50000 CAPSULE ORAL
Qty: 12 CAPSULE | Refills: 2 | Status: SHIPPED | OUTPATIENT
Start: 2024-06-25

## 2024-06-25 RX ORDER — LISINOPRIL AND HYDROCHLOROTHIAZIDE 12.5; 2 MG/1; MG/1
1 TABLET ORAL DAILY
Qty: 90 TABLET | Refills: 2 | Status: SHIPPED | OUTPATIENT
Start: 2024-06-25

## 2024-06-25 NOTE — PROGRESS NOTES
Annual Comprehensive Medical Exam    74 y.o. male presents for annual comprehensive medical evaluation and preventive services screening.  No recent hospitalizations, surgeries or significant injuries.    HPI    Ocular pattern in visual field haven't occurred isince mid March.  Never got to Neurologist.  Duration of optical effect is at most 20 mins.  Seems to happen cyclically.  Never took Topamax for prophylaxis.  Ophtho has no diagnosis but retina apparently is OK.      Weight loss: 13 lb over 12 months with diet and exercise.  Using a calorie paco.  Goal is another 10# loss.    IBS constipation well controlled with fiber, water and occasional MiraLax.    BPH managed with Flomax.    Colonoscopy 2021    KARAN managed with CPAP.  Uses Flomax nightly to maintain unobstructed nasal passages.  Continues to sleep better, has more energy.      Past Medical History:   Diagnosis Date    A-fib (Multi) 06/19/2023    Atrial fibrillation, chronic (Multi) 06/19/2023    COVID-19     COVID-19 virus infection    Influenza due to other identified influenza virus with other respiratory manifestations 01/16/2017    Influenza A    Personal history of other diseases of the digestive system 12/30/2019    History of umbilical hernia    Personal history of other malignant neoplasm of skin     History of other malignant neoplasm of skin    Viral URI with cough 06/19/2023      Past Surgical History:   Procedure Laterality Date    CARDIAC ELECTROPHYSIOLOGY MAPPING AND ABLATION  10/25/2017    COLONOSCOPY  12/27/2021    Complete Colonoscopy    OTHER SURGICAL HISTORY  11/03/2016    Cath Stent Placement Number Of Stents Placed:    OTHER SURGICAL HISTORY  12/27/2021    Mohs surgery    OTHER SURGICAL HISTORY  09/06/2022    Esophagogastroduodenoscopy    OTHER SURGICAL HISTORY  02/07/2020    Umbilical hernia repair    TRANSURETHRAL RESECTION OF PROSTATE  10/20/2018    Transurethral Resection Of Prostate (TURP)     No family history on file.   Social  History     Socioeconomic History    Marital status:      Spouse name: Not on file    Number of children: Not on file    Years of education: Not on file    Highest education level: Not on file   Occupational History    Not on file   Tobacco Use    Smoking status: Never    Smokeless tobacco: Never   Substance and Sexual Activity    Alcohol use: Yes     Alcohol/week: 14.0 standard drinks of alcohol     Types: 14 Glasses of wine per week    Drug use: Never    Sexual activity: Not on file   Other Topics Concern    Not on file   Social History Narrative    Not on file     Social Determinants of Health     Financial Resource Strain: Not on file   Food Insecurity: Not on file   Transportation Needs: Not on file   Physical Activity: Not on file   Stress: Not on file   Social Connections: Not on file   Intimate Partner Violence: Not on file   Housing Stability: Not on file       Current Outpatient Medications on File Prior to Visit   Medication Sig Dispense Refill    aspirin 81 mg chewable tablet Chew 1 tablet (81 mg) once daily.      ergocalciferol (Vitamin D-2) 1.25 MG (23807 UT) capsule Take 1 capsule (50,000 Units) by mouth 1 (one) time per week. 12 capsule 2    fluticasone (Flonase Allergy Relief) 50 mcg/actuation nasal spray Administer 1 spray into affected nostril(s) once daily.      lisinopriL-hydrochlorothiazide 20-12.5 mg tablet Take 1 tablet by mouth once daily. 90 tablet 2    loratadine (Claritin) 10 mg tablet Take 1 tablet (10 mg) by mouth once daily.      melatonin 10 mg tablet Take 1 tablet (10 mg) by mouth once daily at bedtime.      multivitamin tablet Take 1 tablet by mouth once daily.      omega-3 fatty acids-fish oil 300-1,000 mg capsule Take 1 tablet by mouth once daily.      omeprazole OTC (PriLOSEC OTC) 20 mg EC tablet Take 1 tablet (20 mg) by mouth once daily in the morning. Take before meals.      sotalol (Betapace) 120 mg tablet Take 1 tablet (120 mg) by mouth every 12 hours. 180 tablet 2     "tamsulosin (Flomax) 0.4 mg 24 hr capsule Take 1 capsule (0.4 mg) by mouth once daily. 90 capsule 2    topiramate (Topamax) 25 mg tablet Take 1 tablet (25 mg) by mouth 2 times a day. (Patient not taking: Reported on 5/7/2024) 60 tablet 5     No current facility-administered medications on file prior to visit.       Allergies   Allergen Reactions    Famotidine Other    Rosuvastatin Other    Simvastatin Other    Codeine Anxiety and Unknown     Nausea         Review of Systems:  Complete review of systems is negative today except for that mentioned in the history of present illness.  In particular patient denies chest pain, shortness of breath, headaches and GI disturbances.      Visit Vitals  /86   Pulse 58   Ht 1.778 m (5' 10\")   Wt 82.1 kg (181 lb)   SpO2 96%   BMI 25.97 kg/m²   Smoking Status Never   BSA 2.01 m²      Physical Exam  Gen: Alert and oriented ×3 male in no acute distress.  HEENT: Head is normocephalic.  Extraocular muscles are intact.  Tympanic membranes are clear.  Pharynx is clear.  Neck is supple without adenopathy or carotid bruits.  No masses or thyromegaly  Heart: Regular rate and rhythm without murmurs.  Lungs: Clear to auscultation bilaterally.  Abdomen: Soft with normal bowel sounds.  No masses or pain to palpation.  No bruits auscultated.  Extremities: Good range of motion of all joints.  No significant edema. Pedal pulses +1-2/4  Neuro: No signs of focal neurologic deficit.  No tremor.  Speech and hearing are normal.  DTRs +3/4;  Muscle Strength +5/5.  Musculoskeletal: Spine with good ROM.  No scoliosis.  Leg lengths are equal.  Skin: No significant or irregular nevi visualized.  Psych: normal affect.  No suicidal ideation.  Good judgement and insight.       DIAGNOSIS/PLAN  1. Hypertension, unspecified type  - Comprehensive Metabolic Panel; Future  - TSH with reflex to Free T4 if abnormal; Future  - ECG 12 lead (Ancillary Performed)  - lisinopriL-hydrochlorothiazide 20-12.5 mg tablet; " Take 1 tablet by mouth once daily.  Dispense: 90 tablet; Refill: 2    2. Hyperlipidemia, unspecified hyperlipidemia type  - Lipid Panel; Future  - TSH with reflex to Free T4 if abnormal; Future    3. Vitamin D deficiency  - CBC; Future  - Vitamin D 25-Hydroxy,Total (for eval of Vitamin D levels); Future  - ergocalciferol (Vitamin D-2) 1.25 MG (71128 UT) capsule; Take 1 capsule (50,000 Units) by mouth 1 (one) time per week.  Dispense: 12 capsule; Refill: 2    4. Irritable bowel syndrome with predominant constipation  - continue increased fiber, water and occasional MiraLax  5. KARAN on CPAP  - continue CPAP    6. Non-seasonal allergic rhinitis due to pollen  - fluticasone (Flonase Allergy Relief) 50 mcg/actuation nasal spray; Administer 1 spray into each nostril once daily.  Dispense: 16 g; Refill: 11    7. Benign prostatic hyperplasia, unspecified whether lower urinary tract symptoms present  - Urinalysis with Reflex Microscopic; Future  - tamsulosin (Flomax) 0.4 mg 24 hr capsule; Take 1 capsule (0.4 mg) by mouth once daily.  Dispense: 90 capsule; Refill: 2    8. Slowing of urinary stream  - Urinalysis with Reflex Microscopic; Future    9. Hypogonadism male  - Testosterone; Future  - TSH with reflex to Free T4 if abnormal; Future        Follow up in 6 months for medical management    I will continue to monitor, evaluate, assess and treat all problems/diagnoses as appropriate and continue to collaborate with specialists.    Contact office or send a  [employer] message with any questions or concerns    Encouraged to sign up with  [employer]  Patient will only be notified of labs that require medical intervention.    Prescriptions will not be filled unless you are compliant with your follow up appointments or have a follow up appointment scheduled as per instruction of your physician. Refills should be requested at the time of your visit.    **Charting was completed using voice recognition technology and may include  unintended errors**    Maurisio Combs DO, FACOFP  Senior Attending Physician  [facility]  [address], [address]  [place] [id number] [phone]      Maurisio Combs DO, FACOFP

## 2024-08-06 ENCOUNTER — LAB (OUTPATIENT)
Dept: LAB | Facility: LAB | Age: 74
End: 2024-08-06
Payer: MEDICARE

## 2024-08-06 DIAGNOSIS — E29.1 HYPOGONADISM MALE: ICD-10-CM

## 2024-08-06 DIAGNOSIS — R39.198 SLOWING OF URINARY STREAM: ICD-10-CM

## 2024-08-06 DIAGNOSIS — N40.0 BENIGN PROSTATIC HYPERPLASIA, UNSPECIFIED WHETHER LOWER URINARY TRACT SYMPTOMS PRESENT: ICD-10-CM

## 2024-08-06 DIAGNOSIS — E55.9 VITAMIN D DEFICIENCY: ICD-10-CM

## 2024-08-06 DIAGNOSIS — E78.5 HYPERLIPIDEMIA, UNSPECIFIED HYPERLIPIDEMIA TYPE: ICD-10-CM

## 2024-08-06 DIAGNOSIS — I10 HYPERTENSION, UNSPECIFIED TYPE: ICD-10-CM

## 2024-08-06 LAB
25(OH)D3 SERPL-MCNC: 47 NG/ML (ref 30–100)
ALBUMIN SERPL BCP-MCNC: 4 G/DL (ref 3.4–5)
ALP SERPL-CCNC: 73 U/L (ref 33–136)
ALT SERPL W P-5'-P-CCNC: 26 U/L (ref 10–52)
ANION GAP SERPL CALC-SCNC: 11 MMOL/L (ref 10–20)
APPEARANCE UR: CLEAR
AST SERPL W P-5'-P-CCNC: 23 U/L (ref 9–39)
BILIRUB SERPL-MCNC: 0.7 MG/DL (ref 0–1.2)
BILIRUB UR STRIP.AUTO-MCNC: NEGATIVE MG/DL
BUN SERPL-MCNC: 20 MG/DL (ref 6–23)
CALCIUM SERPL-MCNC: 9.4 MG/DL (ref 8.6–10.3)
CHLORIDE SERPL-SCNC: 104 MMOL/L (ref 98–107)
CHOLEST SERPL-MCNC: 184 MG/DL (ref 0–199)
CHOLESTEROL/HDL RATIO: 4.7
CO2 SERPL-SCNC: 28 MMOL/L (ref 21–32)
COLOR UR: YELLOW
CREAT SERPL-MCNC: 0.86 MG/DL (ref 0.5–1.3)
EGFRCR SERPLBLD CKD-EPI 2021: 90 ML/MIN/1.73M*2
ERYTHROCYTE [DISTWIDTH] IN BLOOD BY AUTOMATED COUNT: 13.4 % (ref 11.5–14.5)
GLUCOSE SERPL-MCNC: 99 MG/DL (ref 74–99)
GLUCOSE UR STRIP.AUTO-MCNC: NORMAL MG/DL
HCT VFR BLD AUTO: 46.2 % (ref 41–52)
HDLC SERPL-MCNC: 39.2 MG/DL
HGB BLD-MCNC: 15.4 G/DL (ref 13.5–17.5)
KETONES UR STRIP.AUTO-MCNC: NEGATIVE MG/DL
LDLC SERPL CALC-MCNC: 122 MG/DL
LEUKOCYTE ESTERASE UR QL STRIP.AUTO: NEGATIVE
MCH RBC QN AUTO: 30.2 PG (ref 26–34)
MCHC RBC AUTO-ENTMCNC: 33.3 G/DL (ref 32–36)
MCV RBC AUTO: 91 FL (ref 80–100)
NITRITE UR QL STRIP.AUTO: NEGATIVE
NON HDL CHOLESTEROL: 145 MG/DL (ref 0–149)
NRBC BLD-RTO: 0 /100 WBCS (ref 0–0)
PH UR STRIP.AUTO: 5.5 [PH]
PLATELET # BLD AUTO: 180 X10*3/UL (ref 150–450)
POTASSIUM SERPL-SCNC: 4.3 MMOL/L (ref 3.5–5.3)
PROT SERPL-MCNC: 6.7 G/DL (ref 6.4–8.2)
PROT UR STRIP.AUTO-MCNC: NEGATIVE MG/DL
RBC # BLD AUTO: 5.1 X10*6/UL (ref 4.5–5.9)
RBC # UR STRIP.AUTO: NEGATIVE /UL
SODIUM SERPL-SCNC: 139 MMOL/L (ref 136–145)
SP GR UR STRIP.AUTO: 1.02
TRIGL SERPL-MCNC: 115 MG/DL (ref 0–149)
TSH SERPL-ACNC: 2.19 MIU/L (ref 0.44–3.98)
UROBILINOGEN UR STRIP.AUTO-MCNC: NORMAL MG/DL
VLDL: 23 MG/DL (ref 0–40)
WBC # BLD AUTO: 5.1 X10*3/UL (ref 4.4–11.3)

## 2024-08-06 PROCEDURE — 84443 ASSAY THYROID STIM HORMONE: CPT

## 2024-08-06 PROCEDURE — 85027 COMPLETE CBC AUTOMATED: CPT

## 2024-08-06 PROCEDURE — 36415 COLL VENOUS BLD VENIPUNCTURE: CPT

## 2024-08-06 PROCEDURE — 82306 VITAMIN D 25 HYDROXY: CPT

## 2024-08-06 PROCEDURE — 84403 ASSAY OF TOTAL TESTOSTERONE: CPT

## 2024-08-06 PROCEDURE — 80061 LIPID PANEL: CPT

## 2024-08-06 PROCEDURE — 80053 COMPREHEN METABOLIC PANEL: CPT

## 2024-08-06 PROCEDURE — 81003 URINALYSIS AUTO W/O SCOPE: CPT

## 2024-08-07 LAB — TESTOST SERPL-MCNC: 538 NG/DL (ref 240–1000)

## 2024-11-12 DIAGNOSIS — I48.91 ATRIAL FIBRILLATION, UNSPECIFIED TYPE (MULTI): ICD-10-CM

## 2024-11-12 RX ORDER — SOTALOL HYDROCHLORIDE 120 MG/1
120 TABLET ORAL EVERY 12 HOURS SCHEDULED
Qty: 180 TABLET | Refills: 2 | OUTPATIENT
Start: 2024-11-12

## 2024-12-04 DIAGNOSIS — I48.91 ATRIAL FIBRILLATION, UNSPECIFIED TYPE (MULTI): ICD-10-CM

## 2024-12-05 ENCOUNTER — TELEPHONE (OUTPATIENT)
Dept: PRIMARY CARE | Facility: CLINIC | Age: 74
End: 2024-12-05
Payer: MEDICARE

## 2024-12-05 DIAGNOSIS — I48.91 ATRIAL FIBRILLATION, UNSPECIFIED TYPE (MULTI): ICD-10-CM

## 2024-12-05 RX ORDER — SOTALOL HYDROCHLORIDE 120 MG/1
120 TABLET ORAL EVERY 12 HOURS SCHEDULED
Qty: 180 TABLET | Refills: 2 | OUTPATIENT
Start: 2024-12-05

## 2024-12-05 NOTE — TELEPHONE ENCOUNTER
Rx Refill Request Telephone Encounter    Name:  Rom Prince  :  [id number]  Medication Name:  sotalol (Betapace) 120 mg tablet   Dose: 120 mg   Route: oral   Frequency: Every 12 hours scheduled  Dispense Quantity: 180 tablet Refills: 2        Sig: Take 1 tablet (120 mg) by mouth every 12 hours.    Last refill: 23  Specific Pharmacy location:  [facility]/pharmacy #[id number] [place] [id number]    [id number] [address] [facility] [address]   Date of last appointment:  24  Date of next appointment:  12-10-24  Best number to reach patient:  [phone]

## 2024-12-06 RX ORDER — SOTALOL HYDROCHLORIDE 120 MG/1
120 TABLET ORAL EVERY 12 HOURS SCHEDULED
Qty: 180 TABLET | Refills: 0 | Status: SHIPPED | OUTPATIENT
Start: 2024-12-06

## 2024-12-10 ENCOUNTER — APPOINTMENT (OUTPATIENT)
Dept: PRIMARY CARE | Facility: CLINIC | Age: 74
End: 2024-12-10
Payer: MEDICARE

## 2024-12-10 VITALS
SYSTOLIC BLOOD PRESSURE: 138 MMHG | BODY MASS INDEX: 28.6 KG/M2 | OXYGEN SATURATION: 97 % | DIASTOLIC BLOOD PRESSURE: 78 MMHG | WEIGHT: 182.2 LBS | HEIGHT: 67 IN | HEART RATE: 99 BPM

## 2024-12-10 DIAGNOSIS — E55.9 VITAMIN D DEFICIENCY: ICD-10-CM

## 2024-12-10 DIAGNOSIS — E78.5 HYPERLIPIDEMIA, UNSPECIFIED HYPERLIPIDEMIA TYPE: ICD-10-CM

## 2024-12-10 DIAGNOSIS — N40.0 BENIGN PROSTATIC HYPERPLASIA, UNSPECIFIED WHETHER LOWER URINARY TRACT SYMPTOMS PRESENT: ICD-10-CM

## 2024-12-10 DIAGNOSIS — J30.89 ENVIRONMENTAL AND SEASONAL ALLERGIES: ICD-10-CM

## 2024-12-10 DIAGNOSIS — Z00.00 MEDICARE ANNUAL WELLNESS VISIT, SUBSEQUENT: ICD-10-CM

## 2024-12-10 DIAGNOSIS — I48.91 ATRIAL FIBRILLATION, UNSPECIFIED TYPE (MULTI): ICD-10-CM

## 2024-12-10 DIAGNOSIS — I10 HYPERTENSION, UNSPECIFIED TYPE: Primary | ICD-10-CM

## 2024-12-10 DIAGNOSIS — J30.1 NON-SEASONAL ALLERGIC RHINITIS DUE TO POLLEN: ICD-10-CM

## 2024-12-10 PROCEDURE — 99497 ADVNCD CARE PLAN 30 MIN: CPT | Performed by: FAMILY MEDICINE

## 2024-12-10 PROCEDURE — 1123F ACP DISCUSS/DSCN MKR DOCD: CPT | Performed by: FAMILY MEDICINE

## 2024-12-10 PROCEDURE — 3078F DIAST BP <80 MM HG: CPT | Performed by: FAMILY MEDICINE

## 2024-12-10 PROCEDURE — 1159F MED LIST DOCD IN RCRD: CPT | Performed by: FAMILY MEDICINE

## 2024-12-10 PROCEDURE — G0439 PPPS, SUBSEQ VISIT: HCPCS | Performed by: FAMILY MEDICINE

## 2024-12-10 PROCEDURE — 99214 OFFICE O/P EST MOD 30 MIN: CPT | Performed by: FAMILY MEDICINE

## 2024-12-10 PROCEDURE — 80053 COMPREHEN METABOLIC PANEL: CPT

## 2024-12-10 PROCEDURE — 1036F TOBACCO NON-USER: CPT | Performed by: FAMILY MEDICINE

## 2024-12-10 PROCEDURE — 3075F SYST BP GE 130 - 139MM HG: CPT | Performed by: FAMILY MEDICINE

## 2024-12-10 PROCEDURE — 1160F RVW MEDS BY RX/DR IN RCRD: CPT | Performed by: FAMILY MEDICINE

## 2024-12-10 PROCEDURE — G0446 INTENS BEHAVE THER CARDIO DX: HCPCS | Performed by: FAMILY MEDICINE

## 2024-12-10 PROCEDURE — 1170F FXNL STATUS ASSESSED: CPT | Performed by: FAMILY MEDICINE

## 2024-12-10 RX ORDER — FLUTICASONE PROPIONATE 50 MCG
1 SPRAY, SUSPENSION (ML) NASAL DAILY
Qty: 16 G | Refills: 11 | Status: SHIPPED | OUTPATIENT
Start: 2024-12-10

## 2024-12-10 RX ORDER — SOTALOL HYDROCHLORIDE 120 MG/1
120 TABLET ORAL EVERY 12 HOURS SCHEDULED
Qty: 180 TABLET | Refills: 0 | Status: SHIPPED | OUTPATIENT
Start: 2024-12-10

## 2024-12-10 RX ORDER — ALIROCUMAB 75 MG/ML
INJECTION, SOLUTION SUBCUTANEOUS
Refills: 0 | OUTPATIENT
Start: 2024-12-10

## 2024-12-10 RX ORDER — ERGOCALCIFEROL 1.25 MG/1
50000 CAPSULE ORAL
Qty: 12 CAPSULE | Refills: 2 | Status: SHIPPED | OUTPATIENT
Start: 2024-12-10

## 2024-12-10 RX ORDER — LORATADINE 10 MG/1
10 TABLET ORAL DAILY
Qty: 90 TABLET | Refills: 2 | Status: SHIPPED | OUTPATIENT
Start: 2024-12-10

## 2024-12-10 RX ORDER — TAMSULOSIN HYDROCHLORIDE 0.4 MG/1
0.4 CAPSULE ORAL DAILY
Qty: 90 CAPSULE | Refills: 2 | Status: SHIPPED | OUTPATIENT
Start: 2024-12-10

## 2024-12-10 RX ORDER — LISINOPRIL AND HYDROCHLOROTHIAZIDE 12.5; 2 MG/1; MG/1
1 TABLET ORAL DAILY
Qty: 90 TABLET | Refills: 2 | Status: SHIPPED | OUTPATIENT
Start: 2024-12-10

## 2024-12-10 RX ORDER — EVOLOCUMAB 140 MG/ML
140 INJECTION, SOLUTION SUBCUTANEOUS
Qty: 2 ML | Refills: 5 | Status: SHIPPED | OUTPATIENT
Start: 2024-12-10

## 2024-12-10 ASSESSMENT — ACTIVITIES OF DAILY LIVING (ADL)
MANAGING_FINANCES: INDEPENDENT
DRESSING: INDEPENDENT
BATHING: INDEPENDENT
TAKING_MEDICATION: INDEPENDENT
DOING_HOUSEWORK: INDEPENDENT
GROCERY_SHOPPING: INDEPENDENT

## 2024-12-10 ASSESSMENT — PATIENT HEALTH QUESTIONNAIRE - PHQ9
SUM OF ALL RESPONSES TO PHQ9 QUESTIONS 1 AND 2: 0
2. FEELING DOWN, DEPRESSED OR HOPELESS: NOT AT ALL
SUM OF ALL RESPONSES TO PHQ9 QUESTIONS 1 AND 2: 0
2. FEELING DOWN, DEPRESSED OR HOPELESS: NOT AT ALL
1. LITTLE INTEREST OR PLEASURE IN DOING THINGS: NOT AT ALL
1. LITTLE INTEREST OR PLEASURE IN DOING THINGS: NOT AT ALL

## 2024-12-10 NOTE — PROGRESS NOTES
General Medical Management Note and annual Medicare wellness visit    74 y.o. 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  13 events since last office visit.  Seem to come in clusters.  No treatment.  Being monitored by Ophthalmology.  Concerned about cholesterol.  Last .  BP at home <1320/70.  - also will be seeing a Neurology NP in near future.    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  Managed by Dr. Barksdale.  Taking sotalol.  Resting HR 50-60.   At gym, not able to get heart rate above 90.  Dr. Barksdale aware.  Ablation 4 years ago.  Taking ASA daily    BPH symptoms controlled fairly well with Flomax.  Still with dribbling.  Has had several prostate surgeries.  None really helped.          Past Medical History:   Diagnosis Date    A-fib (Multi) 06/19/2023    Atrial fibrillation, chronic (Multi) 06/19/2023    COVID-19     COVID-19 virus infection    Influenza due to other identified influenza virus with other respiratory manifestations 01/16/2017    Influenza A    Personal history of other diseases of the digestive system 12/30/2019    History of umbilical hernia    Personal history of other malignant neoplasm of skin     History of other malignant neoplasm of skin    Viral URI with cough 06/19/2023      Past Surgical History:   Procedure Laterality Date    CARDIAC ELECTROPHYSIOLOGY MAPPING AND ABLATION  10/25/2017    COLONOSCOPY  12/27/2021    Complete Colonoscopy    OTHER SURGICAL HISTORY  11/03/2016    Cath Stent Placement Number Of Stents Placed:    OTHER SURGICAL HISTORY  12/27/2021    Mohs surgery    OTHER SURGICAL HISTORY  09/06/2022    Esophagogastroduodenoscopy    OTHER SURGICAL HISTORY  02/07/2020    Umbilical hernia repair    TRANSURETHRAL RESECTION OF PROSTATE  10/20/2018    Transurethral Resection Of Prostate (TURP)     No family history on file.   Social History      Socioeconomic History    Marital status:      Spouse name: Not on file    Number of children: Not on file    Years of education: Not on file    Highest education level: Not on file   Occupational History    Not on file   Tobacco Use    Smoking status: Never    Smokeless tobacco: Never   Substance and Sexual Activity    Alcohol use: Yes     Alcohol/week: 14.0 standard drinks of alcohol     Types: 14 Glasses of wine per week    Drug use: Never    Sexual activity: Not on file   Other Topics Concern    Not on file   Social History Narrative    Not on file     Social Drivers of Health     Financial Resource Strain: Not on file   Food Insecurity: Not on file   Transportation Needs: Not on file   Physical Activity: Not on file   Stress: Not on file   Social Connections: Not on file   Intimate Partner Violence: Not on file   Housing Stability: Not on file       Current Outpatient Medications on File Prior to Visit   Medication Sig Dispense Refill    aspirin 81 mg chewable tablet Chew 1 tablet (81 mg) once daily.      ergocalciferol (Vitamin D-2) 1.25 MG (52833 UT) capsule Take 1 capsule (50,000 Units) by mouth 1 (one) time per week. 12 capsule 2    fluticasone (Flonase Allergy Relief) 50 mcg/actuation nasal spray Administer 1 spray into each nostril once daily. 16 g 11    lisinopriL-hydrochlorothiazide 20-12.5 mg tablet Take 1 tablet by mouth once daily. 90 tablet 2    loratadine (Claritin) 10 mg tablet Take 1 tablet (10 mg) by mouth once daily.      melatonin 10 mg tablet Take 1 tablet (10 mg) by mouth once daily at bedtime.      multivitamin tablet Take 1 tablet by mouth once daily.      omega-3 fatty acids-fish oil 300-1,000 mg capsule Take 1 tablet by mouth once daily.      omeprazole OTC (PriLOSEC OTC) 20 mg EC tablet Take 1 tablet (20 mg) by mouth once daily in the morning. Take before meals.      sotalol (Betapace) 120 mg tablet Take 1 tablet (120 mg) by mouth every 12 hours. 180 tablet 0    tamsulosin  "(Flomax) 0.4 mg 24 hr capsule Take 1 capsule (0.4 mg) by mouth once daily. 90 capsule 2    [DISCONTINUED] sotalol (Betapace) 120 mg tablet Take 1 tablet (120 mg) by mouth every 12 hours. 180 tablet 2     No current facility-administered medications on file prior to visit.       Allergies   Allergen Reactions    Famotidine Other    Rosuvastatin Other    Simvastatin Other    Codeine Anxiety and Unknown     Nausea         ROS: Denies chest pain, SOB, Headache, GI problems     Visit Vitals  /78 (BP Location: Right arm, Patient Position: Sitting)   Pulse 99   Ht 1.689 m (5' 6.5\")   Wt 82.6 kg (182 lb 3.2 oz)   SpO2 97%   BMI 28.97 kg/m²   Smoking Status Never   BSA 1.97 m²      Vitals:    12/10/24 1326   BP: 138/78   BP Location: Right arm   Patient Position: Sitting   Pulse: 99   SpO2: 97%   Weight: 82.6 kg (182 lb 3.2 oz)   Height: 1.689 m (5' 6.5\")       PHYSICAL EXAM:  Alert and oriented x3.  Eyes: EOM grossly intact  Neck supple without lymph adenopathy or carotid bruit.  No masses or thyromegaly  Heart regular rate and rhythm without murmur.  Lungs clear to auscultation.  Legs without edema.  Gait is non-antalgic  Speech clear.  Hearing adequate.      The 10-year ASCVD risk score (Katie ADKINS, et al., 2019) is: 35.1%    Values used to calculate the score:      Age: 74 years      Sex: Male      Is Non- : No      Diabetic: No      Tobacco smoker: No      Systolic Blood Pressure: 138 mmHg      Is BP treated: Yes      HDL Cholesterol: 39.2 mg/dL      Total Cholesterol: 184 mg/dL  I discussed face-to-face with this individual discussing their cardiovascular risk and behavioral therapies of nutritional choices, exercise and elimination of habits contributing to risk.  We agreed on a plan how they may be able to reduce their current cardiovascular risk.  For patients with risk calculation greater than 10%, aspirin use was discussed and encouraged unless known allergy or increased risk of " bleeding contraindicate use.  15 minutes.      DIAGNOSIS/PLAN:  1. Hypertension, unspecified type (Primary)  - Comprehensive Metabolic Panel; Future  - lisinopriL-hydrochlorothiazide 20-12.5 mg tablet; Take 1 tablet by mouth once daily.  Dispense: 90 tablet; Refill: 2    2. Vitamin D deficiency  - ergocalciferol (Vitamin D-2) 1.25 MG (11230 UT) capsule; Take 1 capsule (50,000 Units) by mouth 1 (one) time per week.  Dispense: 12 capsule; Refill: 2    3. Non-seasonal allergic rhinitis due to pollen  - fluticasone (Flonase Allergy Relief) 50 mcg/actuation nasal spray; Administer 1 spray into each nostril once daily.  Dispense: 16 g; Refill: 11    4. Environmental and seasonal allergies  - loratadine (Claritin) 10 mg tablet; Take 1 tablet (10 mg) by mouth once daily.  Dispense: 90 tablet; Refill: 2    5. Atrial fibrillation, unspecified type (Multi)  -Continue to monitor with cardiologist, Dr. Barksdale.  Patient has asked that I prescribe sotalol.  - Patient requested my opinion of whether to discontinue sotalol or continue.  My recommendation is that he continue and to discuss further with Dr. Barksdale.  - sotalol (Betapace) 120 mg tablet; Take 1 tablet (120 mg) by mouth every 12 hours.  Dispense: 180 tablet; Refill: 0    6. Benign prostatic hyperplasia, unspecified whether lower urinary tract symptoms present  -Discussed ability to increase Flomax to 0.4 mg 2 capsules daily or add Proscar.  - tamsulosin (Flomax) 0.4 mg 24 hr capsule; Take 1 capsule (0.4 mg) by mouth once daily.  Dispense: 90 capsule; Refill: 2    7. Hyperlipidemia, unspecified hyperlipidemia type  - evolocumab (Repatha SureClick) 140 mg/mL injection; Inject 1 mL (140 mg) under the skin every 14 (fourteen) days.  Dispense: 2 mL; Refill: 5  -Patient is intolerant of statin medications (Crestor, Zocor).  Ophthalmologist concerned about elevated LDL.  Patient is willing to try Repatha if it is cost notable.  -If patient is able to use Repatha, he will notify me  and lab will be ordered for 3 months after initiating Repatha use.    8. Medicare annual wellness visit, subsequent  Living Will / Advanced Care Planning: I spent more than 15 minutes discussing advance care planning including explanation and discussion of advanced directives.  If patient does not have current up-to-date documents, examples and information were provided on how to create both living will and power of .  Patient was encouraged to work on completing these documents.            Return to office in 6 months for comprehensive medical evaluation, long-term medication use monitoring, and preventative services screening    We will continue to monitor, evaluate, assess and treat all problems/diagnoses as appropriate and continue to collaborate with specialists.    Encouraged to sign up with [facility]    Contact office or send a  [employer] message with any questions or concerns    Patient will only be notified of labs that require medical intervention.    Prescriptions will not be filled unless you are compliant with your follow up appointments or have a follow up appointment scheduled as per instruction of your physician. Refills should be requested at the time of your visit.    **Charting was completed using voice recognition technology and may include unintended errors**    Maurisio Combs DO, SANDRA  [address] [facility] [id number]  [place] [address] [phone]  Maurisio Combs DO, FACOFP

## 2024-12-11 DIAGNOSIS — E78.5 HYPERLIPIDEMIA, UNSPECIFIED HYPERLIPIDEMIA TYPE: ICD-10-CM

## 2024-12-11 LAB
ALBUMIN SERPL BCP-MCNC: 4.3 G/DL (ref 3.4–5)
ALP SERPL-CCNC: 78 U/L (ref 33–136)
ALT SERPL W P-5'-P-CCNC: 23 U/L (ref 10–52)
ANION GAP SERPL CALC-SCNC: 10 MMOL/L (ref 10–20)
AST SERPL W P-5'-P-CCNC: 24 U/L (ref 9–39)
BILIRUB SERPL-MCNC: 0.6 MG/DL (ref 0–1.2)
BUN SERPL-MCNC: 27 MG/DL (ref 6–23)
CALCIUM SERPL-MCNC: 9.5 MG/DL (ref 8.6–10.3)
CHLORIDE SERPL-SCNC: 102 MMOL/L (ref 98–107)
CO2 SERPL-SCNC: 29 MMOL/L (ref 21–32)
CREAT SERPL-MCNC: 0.95 MG/DL (ref 0.5–1.3)
EGFRCR SERPLBLD CKD-EPI 2021: 83 ML/MIN/1.73M*2
GLUCOSE SERPL-MCNC: 99 MG/DL (ref 74–99)
POTASSIUM SERPL-SCNC: 4.7 MMOL/L (ref 3.5–5.3)
PROT SERPL-MCNC: 7.4 G/DL (ref 6.4–8.2)
SODIUM SERPL-SCNC: 136 MMOL/L (ref 136–145)

## 2024-12-11 RX ORDER — ALIROCUMAB 75 MG/ML
INJECTION, SOLUTION SUBCUTANEOUS
Refills: 0 | OUTPATIENT
Start: 2024-12-11

## 2024-12-20 ENCOUNTER — APPOINTMENT (OUTPATIENT)
Dept: NEUROLOGY | Facility: CLINIC | Age: 74
End: 2024-12-20
Payer: MEDICARE

## 2024-12-20 VITALS
RESPIRATION RATE: 16 BRPM | TEMPERATURE: 97.7 F | DIASTOLIC BLOOD PRESSURE: 80 MMHG | SYSTOLIC BLOOD PRESSURE: 136 MMHG | HEART RATE: 57 BPM

## 2024-12-20 DIAGNOSIS — H53.9 VISUAL AURA: ICD-10-CM

## 2024-12-20 DIAGNOSIS — H53.9 VISUAL DISTURBANCE: Primary | ICD-10-CM

## 2024-12-20 DIAGNOSIS — H55.00 NYSTAGMUS: ICD-10-CM

## 2024-12-20 DIAGNOSIS — G43.109 OCULAR MIGRAINE: ICD-10-CM

## 2024-12-20 PROCEDURE — 3079F DIAST BP 80-89 MM HG: CPT

## 2024-12-20 PROCEDURE — 1036F TOBACCO NON-USER: CPT

## 2024-12-20 PROCEDURE — 1159F MED LIST DOCD IN RCRD: CPT

## 2024-12-20 PROCEDURE — 1123F ACP DISCUSS/DSCN MKR DOCD: CPT

## 2024-12-20 PROCEDURE — 1126F AMNT PAIN NOTED NONE PRSNT: CPT

## 2024-12-20 PROCEDURE — 99205 OFFICE O/P NEW HI 60 MIN: CPT

## 2024-12-20 PROCEDURE — 3075F SYST BP GE 130 - 139MM HG: CPT

## 2024-12-20 ASSESSMENT — PAIN SCALES - GENERAL: PAINLEVEL_OUTOF10: 0-NO PAIN

## 2024-12-20 NOTE — PROGRESS NOTES
"Chief Complaint   Patient presents with    New Patient Visit    Migraine     Ocular migraines. No headaches- visual disturbance- brought a picture with him. Last about 15 minutes.        Subjective   HPI  Rom Prince is a 74 y.o. year old male who presents with chief complaint Visual disturbance [H53.9]    PMH significant for ocular migraines,visual disturbance, bilateral hearing loss, seasonal allergies, KARAN (CPAP) neck pain, hyperlipidemia, PVCs, Afib (successful cardiac ablation)chest pain, HTN, DJD, BPH, and IBS.    Rom started experiencing visual disturbances at age 72 (2022). Had almost no ocdurrences in 2023, but have started to occur more frequently in 2024.  When the visual disturbances are present, they occur 2 to 4 times per month.   Triggers include  bright lights.  (Opening the front door, the sun/ bright light was especially intense).   Visual auras come on gradually. Usually are in the upper left visual field. More recently, had the same symptoms in the upper right visual field. The duration is usually 15-20 minutes. The symptoms then fade over a minute or 2 and completely subside. The symptoms are not followed by any head pain or headache.   The visual disturbance he experiences is a \"C\" shape of zig-zag lines or interlocking triangles that appear to be stationary, sometimes drifting. The lines have a reflective appearance and \"shimmer\" within the visual field. The visual disturbance is usually black and white, but sometimes has colors mixed in. The changes remain the same whether the eyes are open or closed.  No associated photophobia/phonophobia, or nausea. Also denies the presence of numbness/tingling, vestibular symptoms, heart palpitations, or changes in blood pressure. There is no presence of Dysgeusia,or parosmias, no associated excessive lacrimation or rhinorrhea. No expressive or receptive aphasia or other changes in speech, no pause in movement.   Caffeine: one 6oz cup of coffee which " he mixes with 8oz decaffeinated.  No recent sinus infections (last was 1 year ago).  Endorses regular vision checkups. Last ophthalmologist visit a few weeks ago (with dilation).  Regular dental checkups.  Denies the presence of anxiety/depression.  Positive family history of migraine (mother).  Previous MRI brain in 2022 (without contrast).    Endorses tension headaches and sinus headaches that are intermittent, and are unrelated to the visual disturbances. No noted correlation to weather changes. Does endorse nasal congestion and rhinorrhea in the morning- uses Flonase somewhat regularly.      Medications  Preventive: None  *history of kidney stones  Rescue: None    Current Outpatient Medications:     aspirin 81 mg chewable tablet, Chew 1 tablet (81 mg) once daily., Disp: , Rfl:     ergocalciferol (Vitamin D-2) 1.25 MG (11593 UT) capsule, Take 1 capsule (50,000 Units) by mouth 1 (one) time per week., Disp: 12 capsule, Rfl: 2    evolocumab (Repatha SureClick) 140 mg/mL injection, Inject 1 mL (140 mg) under the skin every 14 (fourteen) days., Disp: 2 mL, Rfl: 5    fluticasone (Flonase Allergy Relief) 50 mcg/actuation nasal spray, Administer 1 spray into each nostril once daily., Disp: 16 g, Rfl: 11    lisinopriL-hydrochlorothiazide 20-12.5 mg tablet, Take 1 tablet by mouth once daily., Disp: 90 tablet, Rfl: 2    loratadine (Claritin) 10 mg tablet, Take 1 tablet (10 mg) by mouth once daily., Disp: 90 tablet, Rfl: 2    melatonin 10 mg tablet, Take 1 tablet (10 mg) by mouth once daily at bedtime., Disp: , Rfl:     multivitamin tablet, Take 1 tablet by mouth once daily., Disp: , Rfl:     omega-3 fatty acids-fish oil 300-1,000 mg capsule, Take 1 tablet by mouth once daily., Disp: , Rfl:     omeprazole OTC (PriLOSEC OTC) 20 mg EC tablet, Take 1 tablet (20 mg) by mouth once daily in the morning. Take before meals., Disp: , Rfl:     sotalol (Betapace) 120 mg tablet, Take 1 tablet (120 mg) by mouth every 12 hours., Disp: 180  tablet, Rfl: 0    tamsulosin (Flomax) 0.4 mg 24 hr capsule, Take 1 capsule (0.4 mg) by mouth once daily., Disp: 90 capsule, Rfl: 2    Allergies   Allergen Reactions    Famotidine Other    Rosuvastatin Other    Simvastatin Other    Codeine Anxiety and Unknown     Nausea     Past Medical History:   Diagnosis Date    A-fib (Multi) 06/19/2023    Atrial fibrillation, chronic (Multi) 06/19/2023    COVID-19     COVID-19 virus infection    Influenza due to other identified influenza virus with other respiratory manifestations 01/16/2017    Influenza A    Personal history of other diseases of the digestive system 12/30/2019    History of umbilical hernia    Personal history of other malignant neoplasm of skin     History of other malignant neoplasm of skin    Viral URI with cough 06/19/2023     Past Surgical History:   Procedure Laterality Date    CARDIAC ELECTROPHYSIOLOGY MAPPING AND ABLATION  10/25/2017    COLONOSCOPY  12/27/2021    Complete Colonoscopy    OTHER SURGICAL HISTORY  11/03/2016    Cath Stent Placement Number Of Stents Placed:    OTHER SURGICAL HISTORY  12/27/2021    Mohs surgery    OTHER SURGICAL HISTORY  09/06/2022    Esophagogastroduodenoscopy    OTHER SURGICAL HISTORY  02/07/2020    Umbilical hernia repair    TRANSURETHRAL RESECTION OF PROSTATE  10/20/2018    Transurethral Resection Of Prostate (TURP)     No family history on file.  Social History     Tobacco Use    Smoking status: Never    Smokeless tobacco: Never   Substance Use Topics    Alcohol use: Yes     Alcohol/week: 14.0 standard drinks of alcohol     Types: 14 Glasses of wine per week     Social History     Substance and Sexual Activity   Drug Use Never      ROS  As noted in HPI, otherwise all other systems have been reviewed are negative for complaint.     Objective   General Appearance: Rom is well-developed, well-nourished, 74 y.o. year old male, in no acute distress. Makes good eye contact, is alert, interactive, and cooperative.  Demonstrates recent & remote memory recall. Subjective information consistent with objective assessment.     Vitals:    12/20/24 1031   BP: 136/80   Pulse: 57   Resp: 16   Temp: 36.5 °C (97.7 °F)   PainSc: 0-No pain       Lab Results   Component Value Date    WBC 5.1 08/06/2024    RBC 5.10 08/06/2024    HGB 15.4 08/06/2024    HCT 46.2 08/06/2024     08/06/2024     12/10/2024    K 4.7 12/10/2024     12/10/2024    BUN 27 (H) 12/10/2024    CREATININE 0.95 12/10/2024    EGFR 83 12/10/2024    CALCIUM 9.5 12/10/2024    ALKPHOS 78 12/10/2024    AST 24 12/10/2024    ALT 23 12/10/2024    VITD25 47 08/06/2024    LDLCALC 122 (H) 08/06/2024    CHOL 184 08/06/2024    HDL 39.2 08/06/2024    TRIG 115 08/06/2024    TSH 2.19 08/06/2024      Eye Exam  OPHTHALMOSCOPIC:   The ophthalmoscopic exam was normal. The fundi were well visualized with normal disc margins, clear vessels and vascular pulsations. No disc edema. The cup/disk ratio was not enlarged. No hemorrhages or exudates were present in the posterior segments that were visualized.     CRANIAL NERVES:   CN 2: Visual fields full to confrontation.   CN 3, 4, 6: Pupils round, 4 mm in diameter, equally reactive to light. Eyelids symmetric; no ptosis. EOMs normal alignment, full range with normal saccades, pursuit, & convergence. Positive horizontal nystagmus.   CN 5: Facial sensation intact bilaterally.   CN 7: Normal and symmetric facial strength. Nasolabial folds symmetric.   CN 8: Hearing intact to conversation and finger rub.  CN 9, 10: Palate elevates symmetrically.  CN 11: Normal strength of shoulder shrug and neck turning.   CN 12: Tongue midline, with normal bulk and strength; no fasciculations.     MOTOR:   Muscle bulk and tone were normal in both upper and lower extremities.   No pronator drift bilaterally.  No fasciculations, tremor or other abnormal movements evident with the patient examined clothed.    STRENGTH:   RUE 5/5  LUE 5/5  RLE 5/5  LLE  5/5    SENSORY:   Sensation intact to light touch throughout all extremities     REFLEXES: R L  Biceps  1 1                     Triceps  2 2  Patellar  1 1     COORDINATION:   In both upper extremities, finger-nose-finger intact without dysmetria or overshoot.     GAIT:   Station stable with a normal base. Gait stable with a normal arm swing and speed. No ataxia, shuffling, steppage or waddling present. No circumduction was present.   No Romberg sign present.    RECORDS REVIEW:  Previous records (provider notes, laboratory reports, and imaging studies were reviewed and summarized).    11/15/2022 MR brain wo IV contrast  Status: Final result     PACS Images     Show images for MR brain wo IV contrast  Signed by    Signed Time Phone Pager   Davina Monet Nancy,  11/15/2022 12:14 [phone]     Exam Information    Status Exam Begun Exam Ended   Final 11/15/2022 10:26 11/15/2022 10:48     Study Result    Narrative & Impression   MRN: [id number]  Patient Name: JAMSHID PELAYO     STUDY:  MRI BRAIN WO;  11/15/2022 10:48 am     INDICATION:  Ocular pain; Visual disturbance  H53.9: Visual disturbance H57.10:  Ocular pain.     COMPARISON:  CT head, 05/03/2021.     ACCESSION NUMBER(S):  [id number]     ORDERING CLINICIAN:  DELIA ANDRE     TECHNIQUE:  Axial T2, FLAIR, DWI, gradient echo T2 and sagittal and coronal T1  weighted images of brain were acquired.     FINDINGS:  CSF Spaces: The ventricles, sulci and basal cisterns are within  normal limits. No abnormal extra-axial fluid collection.     Parenchyma: There is no diffusion restriction abnormality to suggest  acute infarct.  There are T2/FLAIR hyperintense foci scattered  throughout the periventricular and subcortical white matter of the  bilateral cerebral hemispheres which likely represent the sequela of  chronic small vessel ischemic disease given patient's age. There is  no mass effect or midline shift. No abnormal hemosiderin deposition.     Paranasal Sinuses and  Mastoids: There are postoperative changes from  partial ethmoidectomies, turbinectomies, medial antrectomies, and  partial resection of the nasal septum. There is extensive polypoid  material and mucosal thickening throughout residual maxillary sinuses  with partial to complete opacification of several residual ethmoid  air cells, left greater than right. There is opacification of the  right sphenoid sinus. Some of the material is hypointense on T2 and  intermediate to hyperintense on T1 weighted imaging which is  nonspecific but may be seen with noninvasive fungal elements,  desiccated secretions, blood products, among others. There are  polypoid foci within the nasal cavities. There is partial  opacification of a few mastoid air cells.     Other: The bilateral parotid glands are enlarged and demonstrate  numerous T1 hypointense and T2/FLAIR hyperintense lesions. The prior  CT head demonstrated fatty infiltration of the parotid glands.     IMPRESSION:  1. No acute intracranial process.  2. Nonspecific white matter changes most likely represent  small-vessel ischemic disease in a patient of this age.  3. Extensive postoperative and inflammatory changes of the paranasal  sinuses and nasal cavities with polypoid foci suggesting sinonasal  polyposis. Correlation with direct visualization is recommended.  4. Nonspecific bilateral enlargement and nodularity of the parotid  glands. The differential diagnosis includes benign lymphoepithelial  lesions, Sjogren syndrome, among others.     11/10/2022 Vascular US Carotid Artery Duplex Bilateral    Indications  Priority: Routine  Signs/Symptoms: Ocular pain; Visual disturbance  H57.10: Ocular pain   Dx: Ocular pain, unspecified eye [H57.10 (ICD-10-CM)]     PACS Images   Show images for Vascular US carotid artery duplex bilateral  Interpretation Summary    MRN: [id number]  Patient Name: JAMSHID PELAYO     STUDY:  US CAROTID BILATERAL DUPLEX;  11/10/2022 10:49 am      INDICATION:  Ocular pain; Visual disturbance  H57.10: Ocular pain.     COMPARISON:  None.     ACCESSION NUMBER(S):  [id number]     ORDERING CLINICIAN:  DELIA ANDRE     TECHNIQUE:  Grayscale, color and spectral Doppler ultrasound evaluation of the  bilateral carotid system.     FINDINGS:  RIGHT:  Grayscale ultrasound reveals  minimal atherosclerotic plaque at the  origin of the right internal carotid artery  extending from the  common carotid artery. There is no narrowing by color Doppler. There  are no significantly elevated velocities in the internal carotid  artery.     RIGHT SIDE PEAK SYSTOLIC VELOCITY:  Peak proximal systolic ICA velocity on the right is  67 cm/s.     Right common and external carotid waveforms are within normal limits.     RIGHT VERTEBRAL ARTERY:  The right vertebral artery demonstrates proximal normal anterograde  flow.     LEFT:  Grayscale ultrasound reveals  minimal atherosclerotic plaque at the  origin of the left internal carotid artery  extending from the common  carotid artery. There is no narrowing by color Doppler. There are no  significantly elevated velocities in the internal carotid artery.     LEFT SIDE PEAK SYSTOLIC VELOCITY:  Peak proximal systolic ICA velocity on the left is  100 cm/s.     Left common and external carotid waveforms are within normal limits.     LEFT VERTEBRAL ARTERY:  The left vertebral artery demonstrates proximal normal anterograde  flow.     IMPRESSION:  Minimal bilateral carotid plaque; no stenosis greater than 50%.     The velocity criteria are extrapolated from diameter data as defined  by the Society of Radiologists in Ultrasound Consensus Conference  Radiology 2003; 229;340-346.  Assessment & Plan  Visual disturbance    Orders:    Referral to Neurology; Future    MR brain w and wo IV contrast; Future    Ocular migraine    Orders:    Referral to Neurology; Future    MR brain w and wo IV contrast; Future    Nystagmus    Orders:    Referral to Neurology;  Future    MR brain w and wo IV contrast; Future    Visual aura            ASSESSMENT/PLAN:  Differential of TIA or focal seizure  MRI of brain w/wo contrast.  See Dr. Paul (neuro-ophthalmology)  Call if you have an increase in symptoms, we can consider anti-convulsant medication as a preventive.  Follow up in 6 months, sooner if needed.   I will communicate results of MRI to you, arrange additional appointment(s) if indicated.    Davina Winn, APRN-CNP

## 2024-12-20 NOTE — PATIENT INSTRUCTIONS
Dear Rom,    Thank you for coming to [facility]. It was a pleasure caring for you today.  The best way to contact me for medication refills or questions about your care is through [employer].  Otherwise, you can contact the office by phone: [phone].    YOLANDA Granados-CNP    MRI of brain  Radiology Scheduling [phone]  See Dr. Ronen Paul (neuro-ophthalmology)   [facility] phone number is [phone].  Call if you have an increase in symptoms, we can consider anti-convulsant medication as a preventive.  Follow up in 6 months, sooner if needed

## 2024-12-30 ENCOUNTER — OFFICE VISIT (OUTPATIENT)
Dept: URGENT CARE | Age: 74
End: 2024-12-30
Payer: MEDICARE

## 2024-12-30 ENCOUNTER — APPOINTMENT (OUTPATIENT)
Dept: NEUROLOGY | Facility: CLINIC | Age: 74
End: 2024-12-30
Payer: MEDICARE

## 2024-12-30 VITALS
SYSTOLIC BLOOD PRESSURE: 116 MMHG | TEMPERATURE: 99.8 F | RESPIRATION RATE: 17 BRPM | WEIGHT: 182 LBS | DIASTOLIC BLOOD PRESSURE: 69 MMHG | HEIGHT: 67 IN | BODY MASS INDEX: 28.56 KG/M2 | HEART RATE: 76 BPM | OXYGEN SATURATION: 98 %

## 2024-12-30 DIAGNOSIS — J06.9 VIRAL UPPER RESPIRATORY ILLNESS: Primary | ICD-10-CM

## 2024-12-30 DIAGNOSIS — J02.9 SORE THROAT: ICD-10-CM

## 2024-12-30 LAB
POC RAPID INFLUENZA A: NEGATIVE
POC RAPID INFLUENZA B: NEGATIVE
POC RAPID STREP: NEGATIVE
POC SARS-COV-2 AG BINAX: NORMAL

## 2024-12-30 PROCEDURE — 1123F ACP DISCUSS/DSCN MKR DOCD: CPT | Performed by: PHYSICIAN ASSISTANT

## 2024-12-30 PROCEDURE — 1159F MED LIST DOCD IN RCRD: CPT | Performed by: PHYSICIAN ASSISTANT

## 2024-12-30 PROCEDURE — 99214 OFFICE O/P EST MOD 30 MIN: CPT | Performed by: PHYSICIAN ASSISTANT

## 2024-12-30 PROCEDURE — 87880 STREP A ASSAY W/OPTIC: CPT | Performed by: PHYSICIAN ASSISTANT

## 2024-12-30 PROCEDURE — 87811 SARS-COV-2 COVID19 W/OPTIC: CPT | Performed by: PHYSICIAN ASSISTANT

## 2024-12-30 PROCEDURE — 3074F SYST BP LT 130 MM HG: CPT | Performed by: PHYSICIAN ASSISTANT

## 2024-12-30 PROCEDURE — 1036F TOBACCO NON-USER: CPT | Performed by: PHYSICIAN ASSISTANT

## 2024-12-30 PROCEDURE — 87804 INFLUENZA ASSAY W/OPTIC: CPT | Performed by: PHYSICIAN ASSISTANT

## 2024-12-30 PROCEDURE — 3078F DIAST BP <80 MM HG: CPT | Performed by: PHYSICIAN ASSISTANT

## 2024-12-30 RX ORDER — PROMETHAZINE HYDROCHLORIDE AND DEXTROMETHORPHAN HYDROBROMIDE 6.25; 15 MG/5ML; MG/5ML
5 SYRUP ORAL NIGHTLY
Qty: 118 ML | Refills: 0 | Status: SHIPPED | OUTPATIENT
Start: 2024-12-30 | End: 2025-01-06

## 2024-12-30 RX ORDER — BENZONATATE 200 MG/1
200 CAPSULE ORAL 3 TIMES DAILY PRN
Qty: 21 CAPSULE | Refills: 0 | Status: SHIPPED | OUTPATIENT
Start: 2024-12-30 | End: 2025-01-06

## 2024-12-30 ASSESSMENT — ENCOUNTER SYMPTOMS: COUGH: 1

## 2024-12-30 NOTE — PROGRESS NOTES
Subjective   Patient ID: Rom Prince is a 74 y.o. male. They present today with a chief complaint of Cough (X3 days , last night was bad cough phlegm was thick and clots of mucus.sore throat ).    History of Present Illness    Cough      74-year-old patient presents to clinic with complaints of productive cough with associated chest congestion, sore throat, chest tightness with deep breaths, nasal congestion ongoing for the past 3 days.  Reports symptoms are worse at night and better throughout the day.  Has tried Mucinex and Tylenol with mild relief. Denies shortness of breath, chest pain, dizziness, fevers, chills, body aches, nausea, vomiting, abdominal pain, diarrhea.          Past Medical History  Allergies as of 12/30/2024 - Reviewed 12/30/2024   Allergen Reaction Noted    Famotidine Other 12/04/2023    Rosuvastatin Other 06/19/2023    Simvastatin Other 06/19/2023    Codeine Anxiety and Unknown 06/19/2023       (Not in a hospital admission)       Past Medical History:   Diagnosis Date    A-fib (Multi) 06/19/2023    Atrial fibrillation, chronic (Multi) 06/19/2023    COVID-19     COVID-19 virus infection    Influenza due to other identified influenza virus with other respiratory manifestations 01/16/2017    Influenza A    Personal history of other diseases of the digestive system 12/30/2019    History of umbilical hernia    Personal history of other malignant neoplasm of skin     History of other malignant neoplasm of skin    Viral URI with cough 06/19/2023       Past Surgical History:   Procedure Laterality Date    CARDIAC ELECTROPHYSIOLOGY MAPPING AND ABLATION  10/25/2017    COLONOSCOPY  12/27/2021    Complete Colonoscopy    OTHER SURGICAL HISTORY  11/03/2016    Cath Stent Placement Number Of Stents Placed:    OTHER SURGICAL HISTORY  12/27/2021    Mohs surgery    OTHER SURGICAL HISTORY  09/06/2022    Esophagogastroduodenoscopy    OTHER SURGICAL HISTORY  02/07/2020    Umbilical hernia repair    TRANSURETHRAL  "RESECTION OF PROSTATE  10/20/2018    Transurethral Resection Of Prostate (TURP)        reports that he has never smoked. He has never been exposed to tobacco smoke. He has never used smokeless tobacco. He reports current alcohol use of about 14.0 standard drinks of alcohol per week. He reports that he does not use drugs.    Review of Systems  ROS negative with the exception as noted on HPI                                 Objective    Vitals:    12/30/24 1540   BP: 116/69   Pulse: 76   Resp: 17   Temp: 37.7 °C (99.8 °F)   SpO2: 98%   Weight: 82.6 kg (182 lb)   Height: 1.702 m (5' 7\")     No LMP for male patient.    Physical Exam  Constitutional:       Appearance: Normal appearance.   HENT:      Head: Normocephalic and atraumatic.      Right Ear: Ear canal and external ear normal. A middle ear effusion is present.      Left Ear: Ear canal and external ear normal.      Nose: Mucosal edema (and erythema. right nare non-patent) and rhinorrhea present. Rhinorrhea is clear.      Right Sinus: No maxillary sinus tenderness or frontal sinus tenderness.      Left Sinus: No maxillary sinus tenderness or frontal sinus tenderness.      Mouth/Throat:      Lips: Pink.      Mouth: Mucous membranes are moist. No oral lesions.      Dentition: Normal dentition. No gingival swelling.      Tongue: No lesions. Tongue does not deviate from midline.      Palate: No mass and lesions.      Pharynx: Posterior oropharyngeal erythema and postnasal drip present. No pharyngeal swelling or uvula swelling.   Cardiovascular:      Rate and Rhythm: Normal rate and regular rhythm.      Heart sounds: No murmur heard.  Pulmonary:      Effort: Pulmonary effort is normal. No respiratory distress.      Breath sounds: Normal breath sounds. No stridor. No wheezing, rhonchi or rales.   Lymphadenopathy:      Cervical: Cervical adenopathy present.   Neurological:      Mental Status: He is alert.         Procedures    Point of Care Test & Imaging Results from this " visit  Results for orders placed or performed in visit on 12/30/24   POCT rapid strep A manually resulted   Result Value Ref Range    POC Rapid Strep Negative Negative   POCT Covid-19 Rapid Antigen   Result Value Ref Range    POC TYLER-COV-2 AG  Presumptive negative test for SARS-CoV-2 (no antigen detected)     Presumptive negative test for SARS-CoV-2 (no antigen detected)   POCT Influenza A/B manually resulted   Result Value Ref Range    POC Rapid Influenza A Negative Negative    POC Rapid Influenza B Negative Negative      No results found.    Diagnostic study results (if any) were reviewed by Marietta Tsai PA-C.    Assessment/Plan   Allergies, medications, history, and pertinent labs/EKGs/Imaging reviewed by Marietta Tsai PA-C.   productive cough with associated chest congestion, sore throat, chest tightness with deep breaths, nasal congestion ongoing for the past 3 days. Advised to drink plenty of fluids, run a cool-mist humidifier in room at night, and get plenty of rest. Pt. is advised to take 10 deep breaths and hours and continue to walk around every couple of hours. Patient should avoid over-exertion and reduce exposure to irritants such as smoke, cold, dry air, and dust. Patient may take acetaminophen or ibuprofen as directed to reduce fever and body aches. Risk, benefits, and potential side effects of medication(s) discussed with pt. Discussed disease/illness presentation, treatment options, progression, complications, and outcomes with patient. Pt. Has expressed understanding and is an agreement of plan of care.    Medical Decision Making      Orders and Diagnoses  Diagnoses and all orders for this visit:  Viral upper respiratory illness  -     promethazine-DM (Phenergan-DM) 6.25-15 mg/5 mL syrup; Take 5 mL by mouth once daily at bedtime for 7 days.  -     benzonatate (Tessalon) 200 mg capsule; Take 1 capsule (200 mg) by mouth 3 times a day as needed for cough for up to 7 days. Do not crush  or chew.  Sore throat  -     POCT rapid strep A manually resulted  -     POCT Covid-19 Rapid Antigen  -     POCT Influenza A/B manually resulted      Medical Admin Record      Patient disposition: Home    Electronically signed by Marietta Tsai PA-C  4:16 PM

## 2025-01-11 ENCOUNTER — HOSPITAL ENCOUNTER (OUTPATIENT)
Dept: RADIOLOGY | Facility: HOSPITAL | Age: 75
Discharge: HOME | End: 2025-01-11
Payer: COMMERCIAL

## 2025-01-11 DIAGNOSIS — H53.9 VISUAL DISTURBANCE: ICD-10-CM

## 2025-01-11 DIAGNOSIS — H55.00 NYSTAGMUS: ICD-10-CM

## 2025-01-11 DIAGNOSIS — G43.109 OCULAR MIGRAINE: ICD-10-CM

## 2025-01-11 PROCEDURE — 70551 MRI BRAIN STEM W/O DYE: CPT | Performed by: RADIOLOGY

## 2025-01-11 PROCEDURE — 70551 MRI BRAIN STEM W/O DYE: CPT

## 2025-03-11 DIAGNOSIS — N40.0 BENIGN PROSTATIC HYPERPLASIA, UNSPECIFIED WHETHER LOWER URINARY TRACT SYMPTOMS PRESENT: ICD-10-CM

## 2025-03-11 RX ORDER — TAMSULOSIN HYDROCHLORIDE 0.4 MG/1
0.8 CAPSULE ORAL DAILY
Qty: 180 CAPSULE | Refills: 2 | Status: SHIPPED | OUTPATIENT
Start: 2025-03-11

## 2025-04-10 ENCOUNTER — APPOINTMENT (OUTPATIENT)
Dept: CARDIOLOGY | Facility: HOSPITAL | Age: 75
End: 2025-04-10
Payer: COMMERCIAL

## 2025-04-10 ENCOUNTER — HOSPITAL ENCOUNTER (EMERGENCY)
Facility: HOSPITAL | Age: 75
Discharge: HOME | End: 2025-04-10
Attending: STUDENT IN AN ORGANIZED HEALTH CARE EDUCATION/TRAINING PROGRAM
Payer: COMMERCIAL

## 2025-04-10 ENCOUNTER — APPOINTMENT (OUTPATIENT)
Dept: RADIOLOGY | Facility: HOSPITAL | Age: 75
End: 2025-04-10
Payer: COMMERCIAL

## 2025-04-10 VITALS
DIASTOLIC BLOOD PRESSURE: 87 MMHG | SYSTOLIC BLOOD PRESSURE: 131 MMHG | HEART RATE: 49 BPM | TEMPERATURE: 97.9 F | RESPIRATION RATE: 16 BRPM | WEIGHT: 183 LBS | OXYGEN SATURATION: 97 % | HEIGHT: 67 IN | BODY MASS INDEX: 28.72 KG/M2

## 2025-04-10 DIAGNOSIS — R07.9 CHEST PAIN, UNSPECIFIED TYPE: Primary | ICD-10-CM

## 2025-04-10 LAB
ALBUMIN SERPL BCP-MCNC: 4 G/DL (ref 3.4–5)
ALP SERPL-CCNC: 68 U/L (ref 33–136)
ALT SERPL W P-5'-P-CCNC: 21 U/L (ref 10–52)
ANION GAP SERPL CALC-SCNC: 12 MMOL/L (ref 10–20)
APPEARANCE UR: CLEAR
AST SERPL W P-5'-P-CCNC: 20 U/L (ref 9–39)
ATRIAL RATE: 49 BPM
ATRIAL RATE: 52 BPM
ATRIAL RATE: 53 BPM
BASOPHILS # BLD AUTO: 0.03 X10*3/UL (ref 0–0.1)
BASOPHILS NFR BLD AUTO: 0.5 %
BILIRUB SERPL-MCNC: 0.5 MG/DL (ref 0–1.2)
BILIRUB UR STRIP.AUTO-MCNC: NEGATIVE MG/DL
BNP SERPL-MCNC: 69 PG/ML (ref 0–99)
BUN SERPL-MCNC: 19 MG/DL (ref 6–23)
CALCIUM SERPL-MCNC: 9.3 MG/DL (ref 8.6–10.3)
CARDIAC TROPONIN I PNL SERPL HS: 3 NG/L (ref 0–20)
CARDIAC TROPONIN I PNL SERPL HS: <3 NG/L (ref 0–20)
CHLORIDE SERPL-SCNC: 102 MMOL/L (ref 98–107)
CO2 SERPL-SCNC: 28 MMOL/L (ref 21–32)
COLOR UR: YELLOW
CREAT SERPL-MCNC: 0.87 MG/DL (ref 0.5–1.3)
D DIMER PPP FEU-MCNC: 267 NG/ML FEU
EGFRCR SERPLBLD CKD-EPI 2021: 89 ML/MIN/1.73M*2
EOSINOPHIL # BLD AUTO: 0.12 X10*3/UL (ref 0–0.4)
EOSINOPHIL NFR BLD AUTO: 2.1 %
ERYTHROCYTE [DISTWIDTH] IN BLOOD BY AUTOMATED COUNT: 13.1 % (ref 11.5–14.5)
GLUCOSE SERPL-MCNC: 109 MG/DL (ref 74–99)
GLUCOSE UR STRIP.AUTO-MCNC: NORMAL MG/DL
HCT VFR BLD AUTO: 45.1 % (ref 41–52)
HGB BLD-MCNC: 14.8 G/DL (ref 13.5–17.5)
HOLD SPECIMEN: NORMAL
IMM GRANULOCYTES # BLD AUTO: 0.02 X10*3/UL (ref 0–0.5)
IMM GRANULOCYTES NFR BLD AUTO: 0.4 % (ref 0–0.9)
KETONES UR STRIP.AUTO-MCNC: NEGATIVE MG/DL
LEUKOCYTE ESTERASE UR QL STRIP.AUTO: NEGATIVE
LYMPHOCYTES # BLD AUTO: 1.34 X10*3/UL (ref 0.8–3)
LYMPHOCYTES NFR BLD AUTO: 23.6 %
MCH RBC QN AUTO: 29 PG (ref 26–34)
MCHC RBC AUTO-ENTMCNC: 32.8 G/DL (ref 32–36)
MCV RBC AUTO: 88 FL (ref 80–100)
MONOCYTES # BLD AUTO: 0.52 X10*3/UL (ref 0.05–0.8)
MONOCYTES NFR BLD AUTO: 9.2 %
NEUTROPHILS # BLD AUTO: 3.65 X10*3/UL (ref 1.6–5.5)
NEUTROPHILS NFR BLD AUTO: 64.2 %
NITRITE UR QL STRIP.AUTO: NEGATIVE
NRBC BLD-RTO: 0 /100 WBCS (ref 0–0)
P AXIS: 43 DEGREES
P AXIS: 56 DEGREES
P AXIS: 67 DEGREES
P OFFSET: 173 MS
P OFFSET: 185 MS
P OFFSET: 189 MS
P ONSET: 135 MS
P ONSET: 137 MS
P ONSET: 138 MS
PH UR STRIP.AUTO: 6 [PH]
PLATELET # BLD AUTO: 185 X10*3/UL (ref 150–450)
POTASSIUM SERPL-SCNC: 3.9 MMOL/L (ref 3.5–5.3)
PR INTERVAL: 156 MS
PR INTERVAL: 158 MS
PR INTERVAL: 174 MS
PROT SERPL-MCNC: 7 G/DL (ref 6.4–8.2)
PROT UR STRIP.AUTO-MCNC: NEGATIVE MG/DL
Q ONSET: 214 MS
Q ONSET: 215 MS
Q ONSET: 225 MS
QRS COUNT: 8 BEATS
QRS COUNT: 9 BEATS
QRS COUNT: 9 BEATS
QRS DURATION: 76 MS
QRS DURATION: 78 MS
QRS DURATION: 78 MS
QT INTERVAL: 470 MS
QT INTERVAL: 472 MS
QT INTERVAL: 474 MS
QTC CALCULATION(BAZETT): 426 MS
QTC CALCULATION(BAZETT): 437 MS
QTC CALCULATION(BAZETT): 444 MS
QTC FREDERICIA: 441 MS
QTC FREDERICIA: 448 MS
QTC FREDERICIA: 455 MS
R AXIS: -15 DEGREES
R AXIS: -17 DEGREES
R AXIS: -6 DEGREES
RBC # BLD AUTO: 5.11 X10*6/UL (ref 4.5–5.9)
RBC # UR STRIP.AUTO: NEGATIVE MG/DL
SODIUM SERPL-SCNC: 138 MMOL/L (ref 136–145)
SP GR UR STRIP.AUTO: 1.01
T AXIS: 10 DEGREES
T AXIS: 13 DEGREES
T AXIS: 23 DEGREES
T OFFSET: 449 MS
T OFFSET: 451 MS
T OFFSET: 462 MS
UROBILINOGEN UR STRIP.AUTO-MCNC: NORMAL MG/DL
VENTRICULAR RATE: 49 BPM
VENTRICULAR RATE: 52 BPM
VENTRICULAR RATE: 53 BPM
WBC # BLD AUTO: 5.7 X10*3/UL (ref 4.4–11.3)

## 2025-04-10 PROCEDURE — 83880 ASSAY OF NATRIURETIC PEPTIDE: CPT

## 2025-04-10 PROCEDURE — 2500000001 HC RX 250 WO HCPCS SELF ADMINISTERED DRUGS (ALT 637 FOR MEDICARE OP)

## 2025-04-10 PROCEDURE — 93005 ELECTROCARDIOGRAM TRACING: CPT

## 2025-04-10 PROCEDURE — 2500000004 HC RX 250 GENERAL PHARMACY W/ HCPCS (ALT 636 FOR OP/ED)

## 2025-04-10 PROCEDURE — 2500000004 HC RX 250 GENERAL PHARMACY W/ HCPCS (ALT 636 FOR OP/ED): Performed by: STUDENT IN AN ORGANIZED HEALTH CARE EDUCATION/TRAINING PROGRAM

## 2025-04-10 PROCEDURE — 99285 EMERGENCY DEPT VISIT HI MDM: CPT

## 2025-04-10 PROCEDURE — 81003 URINALYSIS AUTO W/O SCOPE: CPT

## 2025-04-10 PROCEDURE — 84075 ASSAY ALKALINE PHOSPHATASE: CPT

## 2025-04-10 PROCEDURE — 36415 COLL VENOUS BLD VENIPUNCTURE: CPT

## 2025-04-10 PROCEDURE — 85379 FIBRIN DEGRADATION QUANT: CPT

## 2025-04-10 PROCEDURE — 71046 X-RAY EXAM CHEST 2 VIEWS: CPT

## 2025-04-10 PROCEDURE — 85025 COMPLETE CBC W/AUTO DIFF WBC: CPT

## 2025-04-10 PROCEDURE — 96375 TX/PRO/DX INJ NEW DRUG ADDON: CPT

## 2025-04-10 PROCEDURE — 99285 EMERGENCY DEPT VISIT HI MDM: CPT | Mod: 25 | Performed by: STUDENT IN AN ORGANIZED HEALTH CARE EDUCATION/TRAINING PROGRAM

## 2025-04-10 PROCEDURE — 96374 THER/PROPH/DIAG INJ IV PUSH: CPT

## 2025-04-10 PROCEDURE — 71046 X-RAY EXAM CHEST 2 VIEWS: CPT | Mod: FOREIGN READ | Performed by: RADIOLOGY

## 2025-04-10 PROCEDURE — 2500000001 HC RX 250 WO HCPCS SELF ADMINISTERED DRUGS (ALT 637 FOR MEDICARE OP): Performed by: PHYSICIAN ASSISTANT

## 2025-04-10 PROCEDURE — 84484 ASSAY OF TROPONIN QUANT: CPT

## 2025-04-10 RX ORDER — MORPHINE SULFATE 4 MG/ML
4 INJECTION, SOLUTION INTRAMUSCULAR; INTRAVENOUS ONCE AS NEEDED
Status: DISCONTINUED | OUTPATIENT
Start: 2025-04-10 | End: 2025-04-10 | Stop reason: HOSPADM

## 2025-04-10 RX ORDER — ONDANSETRON HYDROCHLORIDE 2 MG/ML
4 INJECTION, SOLUTION INTRAVENOUS ONCE
Status: COMPLETED | OUTPATIENT
Start: 2025-04-10 | End: 2025-04-10

## 2025-04-10 RX ORDER — IBUPROFEN 600 MG/1
600 TABLET ORAL ONCE
Status: COMPLETED | OUTPATIENT
Start: 2025-04-10 | End: 2025-04-10

## 2025-04-10 RX ORDER — KETOROLAC TROMETHAMINE 15 MG/ML
15 INJECTION, SOLUTION INTRAMUSCULAR; INTRAVENOUS ONCE
Status: COMPLETED | OUTPATIENT
Start: 2025-04-10 | End: 2025-04-10

## 2025-04-10 RX ORDER — NITROGLYCERIN 0.4 MG/1
0.4 TABLET SUBLINGUAL ONCE
Status: COMPLETED | OUTPATIENT
Start: 2025-04-10 | End: 2025-04-10

## 2025-04-10 RX ADMIN — IBUPROFEN 600 MG: 600 TABLET, FILM COATED ORAL at 07:48

## 2025-04-10 RX ADMIN — KETOROLAC TROMETHAMINE 15 MG: 15 INJECTION, SOLUTION INTRAMUSCULAR; INTRAVENOUS at 08:54

## 2025-04-10 RX ADMIN — NITROGLYCERIN 0.4 MG: 0.4 TABLET SUBLINGUAL at 07:48

## 2025-04-10 RX ADMIN — ONDANSETRON 4 MG: 2 INJECTION INTRAMUSCULAR; INTRAVENOUS at 06:32

## 2025-04-10 ASSESSMENT — PAIN SCALES - GENERAL
PAINLEVEL_OUTOF10: 5 - MODERATE PAIN
PAINLEVEL_OUTOF10: 3
PAINLEVEL_OUTOF10: 5 - MODERATE PAIN

## 2025-04-10 ASSESSMENT — LIFESTYLE VARIABLES
EVER FELT BAD OR GUILTY ABOUT YOUR DRINKING: NO
EVER HAD A DRINK FIRST THING IN THE MORNING TO STEADY YOUR NERVES TO GET RID OF A HANGOVER: NO
HAVE PEOPLE ANNOYED YOU BY CRITICIZING YOUR DRINKING: NO
HAVE YOU EVER FELT YOU SHOULD CUT DOWN ON YOUR DRINKING: NO
TOTAL SCORE: 0

## 2025-04-10 ASSESSMENT — HEART SCORE
HISTORY: MODERATELY SUSPICIOUS
ECG: NORMAL
RISK FACTORS: >2 RISK FACTORS OR HX OF ATHEROSCLEROTIC DISEASE
TROPONIN: LESS THAN OR EQUAL TO NORMAL LIMIT
HEART SCORE: 5
AGE: 65+

## 2025-04-10 ASSESSMENT — PAIN - FUNCTIONAL ASSESSMENT: PAIN_FUNCTIONAL_ASSESSMENT: 0-10

## 2025-04-10 ASSESSMENT — PAIN DESCRIPTION - ORIENTATION: ORIENTATION: MID

## 2025-04-10 ASSESSMENT — PAIN DESCRIPTION - LOCATION: LOCATION: CHEST

## 2025-04-10 ASSESSMENT — PAIN DESCRIPTION - PAIN TYPE: TYPE: ACUTE PAIN

## 2025-04-10 ASSESSMENT — PAIN DESCRIPTION - DESCRIPTORS: DESCRIPTORS: ACHING

## 2025-04-10 ASSESSMENT — COLUMBIA-SUICIDE SEVERITY RATING SCALE - C-SSRS
6. HAVE YOU EVER DONE ANYTHING, STARTED TO DO ANYTHING, OR PREPARED TO DO ANYTHING TO END YOUR LIFE?: NO
1. IN THE PAST MONTH, HAVE YOU WISHED YOU WERE DEAD OR WISHED YOU COULD GO TO SLEEP AND NOT WAKE UP?: NO
2. HAVE YOU ACTUALLY HAD ANY THOUGHTS OF KILLING YOURSELF?: NO

## 2025-04-10 NOTE — ED PROVIDER NOTES
History of Present Illness     History provided by: Patient and Significant Other  Limitations to History: None  External Records Reviewed with Brief Summary: None    HPI:  Rom Prince is a 75 y.o. male with past medical history of cardiac stent x 1placed 20 years ago, atrial fibrillation s/p ablation, and hypertension who presents to the emergency department for concern of 3-4/10 left-sided chest tightness and dull pain radiating to the left shoulder that started approximately 5 AM.  Patient states pain woke him up, he reports shortness of breath when sitting up or deep breathing.  He reports no relief with nitro x 1 dose.  He reports intermittent dizziness for 2 weeks.  He denies fever, chills, body aches, headache, vision changes, vomiting, abdominal pain, back pain or traumatic injury.    Physical Exam   Triage vitals:  T 36.6 °C (97.9 °F)  HR 54  BP (!) 156/91  RR 14  O2 98 % None (Room air)    Physical Exam  Vitals and nursing note reviewed.   Constitutional:       General: He is not in acute distress.  HENT:      Head: Normocephalic and atraumatic.   Eyes:      Extraocular Movements: Extraocular movements intact.      Pupils: Pupils are equal, round, and reactive to light.   Cardiovascular:      Rate and Rhythm: Regular rhythm. Bradycardia present.      Heart sounds: Normal heart sounds.   Pulmonary:      Effort: Pulmonary effort is normal. No tachypnea.      Breath sounds: Normal breath sounds. No decreased breath sounds or wheezing.   Chest:      Chest wall: No tenderness.   Abdominal:      General: Bowel sounds are normal.      Palpations: Abdomen is soft.   Musculoskeletal:      Cervical back: Normal range of motion.      Right lower leg: No edema.      Left lower leg: No edema.   Lymphadenopathy:      Cervical: No cervical adenopathy.   Skin:     General: Skin is warm and dry.   Neurological:      General: No focal deficit present.      Mental Status: He is alert.   Psychiatric:         Mood and  Affect: Mood normal.         Behavior: Behavior normal.          Medical Decision Making & ED Course   Medical Decision Makin y.o. male past medical history presented above, presents for evaluation for left-sided chest pain radiating to left shoulder, with shortness of breath upon deep breathing.  Case discussed with Dr. Niraj Mcdonough.    Upon examination, vitals are stable.  Patient is alert to person, place and time.  Is in no acute distress.  Head normocephalic.  No neurological deficits.  Nares patent. No swelling, exudate or erythema to posterior oropharynx. No neck swelling, nuchal rigidity or cervical adenopathy.  Lung sounds clear to auscultation.  No adventitious lung sounds.  No hypoxia or dyspnea.  Bradycardic at 54.  No chest wall tenderness.  Abdomen is soft, nontender, nondistended.  Normal active bowel sounds.  No palpable masses.  No CVA tenderness.  Pulses intact.  No peripheral edema.    Heart score of 5.    Initial vitals reviewed, 156/91, 54, 14, 98% on room air and 97.9  Will obtain EKG, basic labs, troponin, BNP, D-dimer, urinalysis and CXR.  Zofran and nitro ordered.    Differential diagnoses considered include but are not limited to: ACS, PE, pneumonia,     Social Determinants of Health which Significantly Impact Care: None identified   EKG Independent Interpretation: EKG interpreted by myself. Please see ED Course for full interpretation.    Independent Result Review and Interpretation: Relevant laboratory and radiographic results were reviewed and independently interpreted by myself.  As necessary, they are commented on in the ED Course.    Chronic conditions affecting the patient's care: As documented above in [facility]    The patient was discussed with the following consultants/services: None    Care Considerations: As documented above in [facility]    ED Course:     Disposition   Signout given to Odilia LOPEZ at 0700, pending workup    Procedures   Procedures    This was a shared visit  with an ED attending.  The patient was seen and discussed with the ED attending Dr. Niraj Gracia, YOLANDA-CNP  Emergency Medicine     SALMA Bashir  04/10/25 0727

## 2025-04-10 NOTE — PROGRESS NOTES
"Rom Prince is a 75 y.o. male on day 0 of admission presenting with Chest pain, unspecified type.    Subjective          Objective     Physical Exam    Last Recorded Vitals  Blood pressure 131/87, pulse (!) 49, temperature 36.6 °C (97.9 °F), temperature source Temporal, resp. rate 16, height 1.702 m (5' 7\"), weight 83 kg (183 lb), SpO2 97%.  Intake/Output last 3 Shifts:  No intake/output data recorded.    Relevant Results                              Assessment/Plan   Assessment & Plan               Brain Cook      "

## 2025-04-10 NOTE — H&P
History Of Present Illness  Rom Prince is a 75 y.o. male presenting with      Past Medical History  He has a past medical history of A-fib (Multi) (06/19/2023), Atrial fibrillation, chronic (Multi) (06/19/2023), COVID-19, Influenza due to other identified influenza virus with other respiratory manifestations (01/16/2017), Personal history of other diseases of the digestive system (12/30/2019), Personal history of other malignant neoplasm of skin, and Viral URI with cough (06/19/2023).    Surgical History  He has a past surgical history that includes Other surgical history (11/03/2016); Colonoscopy (12/27/2021); Other surgical history (12/27/2021); Other surgical history (09/06/2022); Other surgical history (02/07/2020); Transurethral resection of prostate (10/20/2018); and Cardiac electrophysiology mapping and ablation (10/25/2017).     Social History  He reports that he has never smoked. He has never been exposed to tobacco smoke. He has never used smokeless tobacco. He reports current alcohol use of about 14.0 standard drinks of alcohol per week. He reports that he does not use drugs.    Family History  No family history on file.     Allergies  Famotidine, Rosuvastatin, Simvastatin, and Codeine    Review of Systems     Physical Exam     Last Recorded Vitals  /79 (BP Location: Right arm, Patient Position: Sitting)   Pulse 52   Temp 36.6 °C (97.9 °F) (Temporal)   Resp 18   Wt 83 kg (183 lb)   SpO2 95%     Relevant Results  {If you would like to pull in Medications, type .meds     If you would like to pull in Lab results for the last 24 hours, type .[id number]    If you would like to pull in Imaging results, type .imgrslt :99}      ***     Assessment/Plan   Assessment & Plan      ***       Randy Mcrae,

## 2025-04-10 NOTE — PROGRESS NOTES
Emergency Medicine Transition of Care Note.    I received Rom Prince in signout from previous team.  Please see the previous ED provider note for all HPI, PE and MDM up to the time of signout at 0700. This is in addition to the primary record.    In brief Rom Prince is an 75 y.o. male presenting for chest pain and SOB  At the time of signout we were awaiting: Remaining labs, CXR.     ED Course as of 04/10/25 0835   Thu Apr 10, 2025   0724 On my independent interpretation of EKG obtained for chest pain HPI sinus bradycardia with a rate of 53 bpm.  QT/QTc 474/444.  QRS 78.  Normal axis and intervals.  No signs of ischemic changes.  Similar when compared to ECG obtained on July 26, 2022. [HK]   0737 XR chest 2 views  IMPRESSION:  No acute abnormality.   [HK]   0749 0734 hrs.: Sinus rhythm with a ventricular rate of 52 bpm.  QRS interval 78 ms.  QTc 437 ms.  No acute ischemic injury pattern appreciated. [NW]      ED Course User Index  [HK] Odilia Coreas PA-C  [NW] Niraj Mcdonough, DO         Diagnoses as of 04/10/25 0835   Chest pain, unspecified type       Labs Reviewed   COMPREHENSIVE METABOLIC PANEL - Abnormal       Result Value    Glucose 109 (*)     Sodium 138      Potassium 3.9      Chloride 102      Bicarbonate 28      Anion Gap 12      Urea Nitrogen 19      Creatinine 0.87      eGFR 89      Calcium 9.3      Albumin 4.0      Alkaline Phosphatase 68      Total Protein 7.0      AST 20      Bilirubin, Total 0.5      ALT 21     B-TYPE NATRIURETIC PEPTIDE - Normal    BNP 69      Narrative:        <100 pg/mL - Heart failure unlikely  100-299 pg/mL - Intermediate probability of acute heart                  failure exacerbation. Correlate with clinical                  context and patient history.    >=300 pg/mL - Heart Failure likely. Correlate with clinical                  context and patient history.    BNP testing is performed using different testing methodology at [facility] than at other  system hospitals. Direct result comparisons should only be made within the same method.      D-DIMER, VTE EXCLUSION - Normal    D-Dimer, Quantitative VTE Exclusion 267      Narrative:     The VTE Exclusion D-Dimer assay is reported in ng/mL Fibrinogen Equivalent Units (FEU).    Per 's instructions for use, a value of less than 500 ng/mL (FEU) may help to exclude DVT or PE in outpatients when the assay is used with a clinical pretest probability assessment.([facility] must utilize and document eCalc 'Wells Score Deep Vein Thrombosis Risk' for DVT exclusion only. Emergency Department should utilize  Guidelines for Emergency Department Use of the VTE Exclusion D-Dimer and Clinical Pretest probability assessment model for DVT or PE exclusion.)   SERIAL TROPONIN-INITIAL - Normal    Troponin I, High Sensitivity <3      Narrative:     Less than 99th percentile of normal range cutoff-  Female and children under 18 years old <14 ng/L; Male <21 ng/L: Negative  Repeat testing should be performed if clinically indicated.     Female and children under 18 years old 14-50 ng/L; Male 21-50 ng/L:  Consistent with possible cardiac damage and possible increased clinical   risk. Serial measurements may help to assess extent of myocardial damage.     >50 ng/L: Consistent with cardiac damage, increased clinical risk and  myocardial infarction. Serial measurements may help assess extent of   myocardial damage.      NOTE: Children less than 1 year old may have higher baseline troponin   levels and results should be interpreted in conjunction with the overall   clinical context.     NOTE: Troponin I testing is performed using a different   testing methodology at [facility] than at other   [facility]. Direct result comparisons should only   be made within the same method.   SERIAL TROPONIN, 1 HOUR - Normal    Troponin I, High Sensitivity 3      Narrative:     Less than 99th percentile of normal range  cutoff-  Female and children under 18 years old <14 ng/L; Male <21 ng/L: Negative  Repeat testing should be performed if clinically indicated.     Female and children under 18 years old 14-50 ng/L; Male 21-50 ng/L:  Consistent with possible cardiac damage and possible increased clinical   risk. Serial measurements may help to assess extent of myocardial damage.     >50 ng/L: Consistent with cardiac damage, increased clinical risk and  myocardial infarction. Serial measurements may help assess extent of   myocardial damage.      NOTE: Children less than 1 year old may have higher baseline troponin   levels and results should be interpreted in conjunction with the overall   clinical context.     NOTE: Troponin I testing is performed using a different   testing methodology at [facility] than at other   [facility]. Direct result comparisons should only   be made within the same method.   CBC WITH AUTO DIFFERENTIAL    WBC 5.7      nRBC 0.0      RBC 5.11      Hemoglobin 14.8      Hematocrit 45.1      MCV 88      MCH 29.0      MCHC 32.8      RDW 13.1      Platelets 185      Neutrophils % 64.2      Immature Granulocytes %, Automated 0.4      Lymphocytes % 23.6      Monocytes % 9.2      Eosinophils % 2.1      Basophils % 0.5      Neutrophils Absolute 3.65      Immature Granulocytes Absolute, Automated 0.02      Lymphocytes Absolute 1.34      Monocytes Absolute 0.52      Eosinophils Absolute 0.12      Basophils Absolute 0.03     TROPONIN SERIES- (INITIAL, 1 HR)    Narrative:     The following orders were created for panel order Troponin I Series, High Sensitivity (0, 1 HR).  Procedure                               Abnormality         Status                     ---------                               -----------         ------                     Troponin I, High Sensiti...[[id number]]  Normal              Final result               Troponin, High Sensitivi...[[id number]]  Normal              Final result                  Please view results for these tests on the individual orders.   URINALYSIS WITH REFLEX CULTURE AND MICROSCOPIC    Narrative:     The following orders were created for panel order Urinalysis with Reflex Culture and Microscopic.  Procedure                               Abnormality         Status                     ---------                               -----------         ------                     Urinalysis with Reflex C...[[id number]]                                                 Extra Urine Gray Tube[[id number]]                                                         Please view results for these tests on the individual orders.   URINALYSIS WITH REFLEX CULTURE AND MICROSCOPIC   EXTRA URINE GRAY TUBE       XR chest 2 views   Final Result   No acute abnormality.   Signed by Jose Tineo MD            Medical Decision Making  Patient is a 75-year-old male with a past medical history of CAD S/P OSMANY, paroxysmal A-fib status post ablation, hyperlipidemia, KARAN on CPAP, PVC arrhythmia, taking sotalol with a resting heart rate between the 50s to 60s, on a daily aspirin that presents to the ED for evaluation of chest pain.  Under my care, patient's heart score is 5.  Vital signs are stable.  On my independent interpretation of chest x-ray there is no acute cardiopulmonary process including cardiomegaly, pneumonia, pneumothorax, pleural effusion.  CBC is without leukocytosis or anemia.  CMP without significant electrolyte abnormality.  Normal renal function.  D-dimer is negative.  BNP 69.  Troponin 3, 3.  Delta 0. ECG is nonischemic.  Nausea has improved after Zofran.  He is still endorsing mild pleuritic chest pain on the left side of his chest.  Given dose of ibuprofen 600 mg. Patient will be admitted to general medicine for further evaluation and management.    Final diagnoses:   [R07.9] Chest pain, unspecified type           Procedure  Procedures    Odilia Coreas PA-C

## 2025-04-16 LAB
ATRIAL RATE: 49 BPM
P AXIS: 67 DEGREES
P OFFSET: 173 MS
P ONSET: 137 MS
PR INTERVAL: 156 MS
Q ONSET: 215 MS
QRS COUNT: 8 BEATS
QRS DURATION: 76 MS
QT INTERVAL: 472 MS
QTC CALCULATION(BAZETT): 426 MS
QTC FREDERICIA: 441 MS
R AXIS: -17 DEGREES
T AXIS: 10 DEGREES
T OFFSET: 451 MS
VENTRICULAR RATE: 49 BPM

## 2025-04-28 NOTE — PROGRESS NOTES
Assessment and Plan    01/11/2025 MRI brain without contrast, which I personally reviewed, shows moderate bihemispheric white matter FLAIR lesions and paranasal sinus disease.  11/15/2022 MRI brain without contrast, which I personally reviewed, shows moderate bihemispheric white matter FLAIR lesions and paranasal sinus disease.  11/10/2022 Vascular US Carotid Artery Duplex Bilateral, by report, shows <50% stenosis bilaterally.  05/03/2021 CT head without contrast, which I personally reviewed, shows paranasal sinus disease.    Lab Results   Component Value Date/Time    SEDRATE 3 01/10/2023 1615    CRP 0.28 01/10/2023 1615     Tuberculosis studies  Lab Results   Component Value Date/Time    TBSIN Negative 01/10/2023 1615     This 75 year-old man with a history of atrial fibrillation, HTN, HLD, migraine, KARAN, IBS, BPH, allergic rhinitis, GERD presents for evaluation of visual disturbances.    His eye examination is remarkable only for common age-related findings. The description is consistent with visual aura of migraine without the headache, sometimes terms acephalgic migraine or late life migraine accompaniment. I see no retina or other eye abnormality. I doubt seizure given duration. MRI was negative for other causes such as small hemorrhages. He could pursue treatment with neurology or pursue no treatment. We discussed any negative events as more concerning for stroke and requiring immediate medical attention.    Plan    Treatment with Neurology as headaches if more troublesome.    Follow up as needed. (Dilated 4/29/2025)

## 2025-04-29 ENCOUNTER — APPOINTMENT (OUTPATIENT)
Dept: OPHTHALMOLOGY | Facility: CLINIC | Age: 75
End: 2025-04-29
Payer: MEDICARE

## 2025-04-29 DIAGNOSIS — H53.9 VISUAL AURA: Primary | ICD-10-CM

## 2025-04-29 DIAGNOSIS — H55.00 NYSTAGMUS: ICD-10-CM

## 2025-04-29 DIAGNOSIS — H53.9 VISUAL DISTURBANCE: ICD-10-CM

## 2025-04-29 PROCEDURE — 99205 OFFICE O/P NEW HI 60 MIN: CPT | Performed by: PSYCHIATRY & NEUROLOGY

## 2025-04-29 ASSESSMENT — ENCOUNTER SYMPTOMS
HEMATOLOGIC/LYMPHATIC NEGATIVE: 0
GASTROINTESTINAL NEGATIVE: 0
CONSTITUTIONAL NEGATIVE: 0
MUSCULOSKELETAL NEGATIVE: 0
NEUROLOGICAL NEGATIVE: 0
CARDIOVASCULAR NEGATIVE: 0
EYES NEGATIVE: 1
RESPIRATORY NEGATIVE: 0
ALLERGIC/IMMUNOLOGIC NEGATIVE: 0
ENDOCRINE NEGATIVE: 0
PSYCHIATRIC NEGATIVE: 0

## 2025-04-29 ASSESSMENT — VISUAL ACUITY
OS_CC+: +1
METHOD: SNELLEN - LINEAR
OS_CC: 20/25
OD_CC: 20/20
CORRECTION_TYPE: GLASSES

## 2025-04-29 ASSESSMENT — TONOMETRY
IOP_METHOD: GOLDMANN APPLANATION
OS_IOP_MMHG: 14
OD_IOP_MMHG: 13

## 2025-04-29 ASSESSMENT — CUP TO DISC RATIO
OS_RATIO: 0.2
OD_RATIO: 0.2

## 2025-04-29 ASSESSMENT — CONF VISUAL FIELD
OS_SUPERIOR_NASAL_RESTRICTION: 0
OD_NORMAL: 1
OD_INFERIOR_NASAL_RESTRICTION: 0
OS_INFERIOR_NASAL_RESTRICTION: 0
METHOD: COUNTING FINGERS
OD_SUPERIOR_TEMPORAL_RESTRICTION: 0
OD_SUPERIOR_NASAL_RESTRICTION: 0
OS_SUPERIOR_TEMPORAL_RESTRICTION: 0
OS_INFERIOR_TEMPORAL_RESTRICTION: 0
OS_NORMAL: 1
OD_INFERIOR_TEMPORAL_RESTRICTION: 0

## 2025-04-29 ASSESSMENT — EXTERNAL EXAM - RIGHT EYE: OD_EXAM: NORMAL

## 2025-04-29 ASSESSMENT — EXTERNAL EXAM - LEFT EYE: OS_EXAM: NORMAL

## 2025-04-29 ASSESSMENT — SLIT LAMP EXAM - LIDS
COMMENTS: NORMAL
COMMENTS: NORMAL

## 2025-06-06 ENCOUNTER — TELEPHONE (OUTPATIENT)
Dept: PRIMARY CARE | Facility: CLINIC | Age: 75
End: 2025-06-06
Payer: COMMERCIAL

## 2025-06-06 DIAGNOSIS — I48.91 ATRIAL FIBRILLATION, UNSPECIFIED TYPE (MULTI): ICD-10-CM

## 2025-06-06 RX ORDER — SOTALOL HYDROCHLORIDE 120 MG/1
120 TABLET ORAL EVERY 12 HOURS SCHEDULED
Qty: 180 TABLET | Refills: 0 | OUTPATIENT
Start: 2025-06-06

## 2025-06-06 RX ORDER — SOTALOL HYDROCHLORIDE 120 MG/1
120 TABLET ORAL EVERY 12 HOURS SCHEDULED
Qty: 60 TABLET | Refills: 0 | Status: SHIPPED | OUTPATIENT
Start: 2025-06-06

## 2025-06-06 NOTE — TELEPHONE ENCOUNTER
Patient only has 2 days left    Med name-sotalol  Med dose-120mg  Med directions-take 1 tablet every 12 hours    Pharmacy-[facility]  Pharmacy address-[place]    LR-12/10/24  LV-12/10/24  NV-06/10/25    Thanks

## 2025-06-10 ENCOUNTER — APPOINTMENT (OUTPATIENT)
Dept: PRIMARY CARE | Facility: CLINIC | Age: 75
End: 2025-06-10
Payer: MEDICARE

## 2025-06-10 VITALS
DIASTOLIC BLOOD PRESSURE: 75 MMHG | HEART RATE: 55 BPM | SYSTOLIC BLOOD PRESSURE: 130 MMHG | WEIGHT: 186 LBS | OXYGEN SATURATION: 97 % | BODY MASS INDEX: 29.19 KG/M2 | HEIGHT: 67 IN

## 2025-06-10 DIAGNOSIS — R39.198 SLOWING OF URINARY STREAM: ICD-10-CM

## 2025-06-10 DIAGNOSIS — M47.816 SPONDYLOSIS OF LUMBAR REGION WITHOUT MYELOPATHY OR RADICULOPATHY: ICD-10-CM

## 2025-06-10 DIAGNOSIS — N40.0 BENIGN PROSTATIC HYPERPLASIA, UNSPECIFIED WHETHER LOWER URINARY TRACT SYMPTOMS PRESENT: ICD-10-CM

## 2025-06-10 DIAGNOSIS — G47.33 OSA ON CPAP: ICD-10-CM

## 2025-06-10 DIAGNOSIS — Z00.00 ENCOUNTER FOR HEALTH MAINTENANCE EXAMINATION: Primary | ICD-10-CM

## 2025-06-10 DIAGNOSIS — K58.1 IRRITABLE BOWEL SYNDROME WITH PREDOMINANT CONSTIPATION: ICD-10-CM

## 2025-06-10 DIAGNOSIS — E55.9 VITAMIN D DEFICIENCY: ICD-10-CM

## 2025-06-10 DIAGNOSIS — K21.9 GASTROESOPHAGEAL REFLUX DISEASE WITHOUT ESOPHAGITIS: ICD-10-CM

## 2025-06-10 DIAGNOSIS — I10 HYPERTENSION, UNSPECIFIED TYPE: ICD-10-CM

## 2025-06-10 DIAGNOSIS — E78.5 HYPERLIPIDEMIA, UNSPECIFIED HYPERLIPIDEMIA TYPE: ICD-10-CM

## 2025-06-10 DIAGNOSIS — I48.91 ATRIAL FIBRILLATION, UNSPECIFIED TYPE (MULTI): ICD-10-CM

## 2025-06-10 DIAGNOSIS — E29.1 HYPOGONADISM MALE: ICD-10-CM

## 2025-06-10 PROCEDURE — 99397 PER PM REEVAL EST PAT 65+ YR: CPT | Performed by: FAMILY MEDICINE

## 2025-06-10 PROCEDURE — 1123F ACP DISCUSS/DSCN MKR DOCD: CPT | Performed by: FAMILY MEDICINE

## 2025-06-10 PROCEDURE — 1159F MED LIST DOCD IN RCRD: CPT | Performed by: FAMILY MEDICINE

## 2025-06-10 PROCEDURE — 1036F TOBACCO NON-USER: CPT | Performed by: FAMILY MEDICINE

## 2025-06-10 PROCEDURE — 3078F DIAST BP <80 MM HG: CPT | Performed by: FAMILY MEDICINE

## 2025-06-10 PROCEDURE — 3075F SYST BP GE 130 - 139MM HG: CPT | Performed by: FAMILY MEDICINE

## 2025-06-10 PROCEDURE — 1160F RVW MEDS BY RX/DR IN RCRD: CPT | Performed by: FAMILY MEDICINE

## 2025-06-10 RX ORDER — ERGOCALCIFEROL 1.25 MG/1
50000 CAPSULE ORAL
Qty: 12 CAPSULE | Refills: 2 | Status: SHIPPED | OUTPATIENT
Start: 2025-06-10

## 2025-06-10 RX ORDER — SOTALOL HYDROCHLORIDE 120 MG/1
120 TABLET ORAL EVERY 12 HOURS SCHEDULED
Qty: 180 TABLET | Refills: 2 | Status: SHIPPED | OUTPATIENT
Start: 2025-06-10

## 2025-06-10 RX ORDER — LISINOPRIL AND HYDROCHLOROTHIAZIDE 12.5; 2 MG/1; MG/1
1 TABLET ORAL DAILY
Qty: 90 TABLET | Refills: 2 | Status: SHIPPED | OUTPATIENT
Start: 2025-06-10

## 2025-06-10 RX ORDER — TAMSULOSIN HYDROCHLORIDE 0.4 MG/1
0.8 CAPSULE ORAL DAILY
Qty: 180 CAPSULE | Refills: 2 | Status: SHIPPED | OUTPATIENT
Start: 2025-06-10

## 2025-06-10 NOTE — PROGRESS NOTES
Annual Comprehensive Medical Exam    75 y.o. male presents for annual comprehensive medical evaluation and preventive services screening.  No recent hospitalizations, surgeries or significant injuries.    HPI  ER in April with chest pain.  Neg work up.  Resolved spontaneously.      Up to date with vaccinations    CAD managed by Dr. Barksdale.  Patient had 1 stent placed approximately 20 years ago and had undergone a cardiac ablation procedure to resolve atrial fibrillation.    BPH - increasing Flomax to 0.8 mg has been helpful but still has morning difficulty.  Has Urology appt later this year.    GERD symptoms worsening recently.  Wondering if should increase Prilosec.    Internal hemorrhoids.  Last colonoscopy in 2021.  Hemorrhoid bleeding is minimized with daily MiraLAX.    KARAN w CPAP has been very beneficial.  Used nightly          Medical History[1]   Surgical History[2]  Family History[3]   Social History     Socioeconomic History    Marital status:      Spouse name: Not on file    Number of children: Not on file    Years of education: Not on file    Highest education level: Not on file   Occupational History    Not on file   Tobacco Use    Smoking status: Never     Passive exposure: Never    Smokeless tobacco: Never   Substance and Sexual Activity    Alcohol use: Yes     Alcohol/week: 14.0 standard drinks of alcohol     Types: 14 Glasses of wine per week    Drug use: Never    Sexual activity: Not on file   Other Topics Concern    Not on file   Social History Narrative    Not on file     Social Drivers of Health     Financial Resource Strain: Not on file   Food Insecurity: Not on file   Transportation Needs: Not on file   Physical Activity: Not on file   Stress: Not on file   Social Connections: Not on file   Intimate Partner Violence: Not on file   Housing Stability: Not on file       Medications Ordered Prior to Encounter[4]    Allergies[5]      Review of Systems:  Complete review of systems is negative  "today except for that mentioned in the history of present illness.  In particular patient denies chest pain, shortness of breath, headaches and GI disturbances.      Visit Vitals  /75   Pulse 55   Ht 1.689 m (5' 6.5\")   Wt 84.4 kg (186 lb)   SpO2 97%   BMI 29.57 kg/m²   Smoking Status Never   BSA 1.99 m²      Vitals:    06/10/25 1358 06/10/25 1403   BP: 150/85 130/75   Pulse: 55    SpO2: 97%    Weight: 84.4 kg (186 lb)    Height: 1.689 m (5' 6.5\")      Physical Exam  Gen: Alert and oriented ×3 male in no acute distress.  HEENT: Head is normocephalic.  Extraocular muscles are intact.  Tympanic membranes are clear.  Pharynx is clear.  Neck is supple without adenopathy or carotid bruits.  No masses or thyromegaly  Heart: Regular rate and rhythm without murmurs.  Lungs: Clear to auscultation bilaterally.  Abdomen: Soft with normal bowel sounds.  No masses or pain to palpation.  No bruits auscultated.  Extremities: Good range of motion of all joints.  No significant edema. Pedal pulses +1-2/4  Neuro: No signs of focal neurologic deficit.  No tremor.  Speech and hearing are normal.  DTRs +3/4;  Muscle Strength +5/5.  Musculoskeletal: Spine with good ROM.  No scoliosis.  Leg lengths are equal.  Skin: No significant or irregular nevi visualized.  Psych: normal affect.  No suicidal ideation.  Good judgement and insight.       DIAGNOSIS/PLAN  1. Encounter for health maintenance examination (Primary)    2. Benign prostatic hyperplasia, unspecified whether lower urinary tract symptoms present  - Prostate Specific Antigen; Future  - Urinalysis with Reflex Microscopic; Future  - Prostate Specific Antigen  - Urinalysis with Reflex Microscopic  - tamsulosin (Flomax) 0.4 mg 24 hr capsule; Take 2 capsules (0.8 mg) by mouth once daily.  Dispense: 180 capsule; Refill: 2    -Does not think his symptoms warrant additional medication but will discuss with his urologist.    3. Slowing of urinary stream  - Prostate Specific Antigen; " Future  - Urinalysis with Reflex Microscopic; Future  - Prostate Specific Antigen  - Urinalysis with Reflex Microscopic    4. Gastroesophageal reflux disease without esophagitis    5. Hyperlipidemia, unspecified hyperlipidemia type  - Comprehensive Metabolic Panel; Future  - Lipid Panel; Future  - Comprehensive Metabolic Panel  - Lipid Panel  - Referral to Clinical Pharmacy; Future    6. Hypertension, unspecified type  - lisinopriL-hydrochlorothiazide 20-12.5 mg tablet; Take 1 tablet by mouth once daily.  Dispense: 90 tablet; Refill: 2    7. Atrial fibrillation, unspecified type (Multi)-resolved  - sotalol (Betapace) 120 mg tablet; Take 1 tablet (120 mg) by mouth every 12 hours.  Dispense: 180 tablet; Refill: 2    8. KARAN on CPAP  Continue nightly use of CPAP    9. Irritable bowel syndrome with predominant constipation  Continue to maintain soft stool and avoid constipation with daily MiraLAX    10. Spondylosis of lumbar region without myelopathy or radiculopathy    11. Vitamin D deficiency  - CBC; Future  - Vitamin D 25-Hydroxy,Total (for eval of Vitamin D levels); Future  - CBC  - Vitamin D 25-Hydroxy,Total (for eval of Vitamin D levels)  - ergocalciferol (Vitamin D-2) 1250 mcg (50,000 units) capsule; Take 1 capsule (50,000 Units) by mouth 1 (one) time per week.  Dispense: 12 capsule; Refill: 2    12. Hypogonadism male  - TSH with reflex to Free T4 if abnormal; Future  - Testosterone; Future  - TSH with reflex to Free T4 if abnormal  - Testosterone        Follow up in 6 months for medical management    I will continue to monitor, evaluate, assess and treat all problems/diagnoses as appropriate and continue to collaborate with specialists.    Contact office or send a  MY Chart message with any questions or concerns    Encouraged to sign up with my  My Chart  Patient will only be notified of labs that require medical intervention.    Prescriptions will not be filled unless you are compliant with your follow up  appointments or have a follow up appointment scheduled as per instruction of your physician. Refills should be requested at the time of your visit.    **Charting was completed using voice recognition technology and may include unintended errors**    Maurisio Combs DO, SANDRA  [id number] [id number]  [place] [address]  [phone]    Maurisio Combs DO, FACOFP           [1]   Past Medical History:  Diagnosis Date    A-fib (Multi) 06/19/2023    Atrial fibrillation, chronic (Multi) 06/19/2023    COVID-19     COVID-19 virus infection    Influenza due to other identified influenza virus with other respiratory manifestations 01/16/2017    Influenza A    Personal history of other diseases of the digestive system 12/30/2019    History of umbilical hernia    Personal history of other malignant neoplasm of skin     History of other malignant neoplasm of skin    Viral URI with cough 06/19/2023   [2]   Past Surgical History:  Procedure Laterality Date    CARDIAC ELECTROPHYSIOLOGY MAPPING AND ABLATION  10/25/2017    COLONOSCOPY  12/27/2021    Complete Colonoscopy    OTHER SURGICAL HISTORY  11/03/2016    Cath Stent Placement Number Of Stents Placed:    OTHER SURGICAL HISTORY  12/27/2021    Mohs surgery    OTHER SURGICAL HISTORY  09/06/2022    Esophagogastroduodenoscopy    OTHER SURGICAL HISTORY  02/07/2020    Umbilical hernia repair    TRANSURETHRAL RESECTION OF PROSTATE  10/20/2018    Transurethral Resection Of Prostate (TURP)   [3] No family history on file.  [4]   Current Outpatient Medications on File Prior to Visit   Medication Sig Dispense Refill    aspirin 81 mg chewable tablet Chew 1 tablet (81 mg) once daily at bedtime.      ergocalciferol (Vitamin D-2) 1.25 MG (77775 UT) capsule Take 1 capsule (50,000 Units) by mouth 1 (one) time per week. 12 capsule 2    fluticasone (Flonase Allergy Relief) 50 mcg/actuation nasal spray Administer 1 spray into each nostril once daily. 16 g 11    lisinopriL-hydrochlorothiazide 20-12.5 mg  tablet Take 1 tablet by mouth once daily. 90 tablet 2    loratadine (Claritin) 10 mg tablet Take 1 tablet (10 mg) by mouth once daily. 90 tablet 2    multivitamin tablet Take 1 tablet by mouth once daily.      omega-3 fatty acids-fish oil 300-1,000 mg capsule Take 1 tablet by mouth once daily at bedtime.      omeprazole OTC (PriLOSEC OTC) 20 mg EC tablet Take 1 tablet (20 mg) by mouth once daily in the morning. Take before meals.      sotalol (Betapace) 120 mg tablet Take 1 tablet (120 mg) by mouth every 12 hours. 60 tablet 0    tamsulosin (Flomax) 0.4 mg 24 hr capsule Take 2 capsules (0.8 mg) by mouth once daily. 180 capsule 2    evolocumab (Repatha SureClick) 140 mg/mL injection Inject 1 mL (140 mg) under the skin every 14 (fourteen) days. (Patient not taking: Reported on 4/10/2025) 2 mL 5    [DISCONTINUED] sotalol (Betapace) 120 mg tablet Take 1 tablet (120 mg) by mouth every 12 hours. 180 tablet 0     No current facility-administered medications on file prior to visit.   [5]   Allergies  Allergen Reactions    Famotidine Other    Rosuvastatin Other    Simvastatin Other    Codeine Anxiety and Unknown     Nausea

## 2025-06-17 ENCOUNTER — APPOINTMENT (OUTPATIENT)
Dept: PHARMACY | Facility: HOSPITAL | Age: 75
End: 2025-06-17
Payer: COMMERCIAL

## 2025-06-17 DIAGNOSIS — E78.5 HYPERLIPIDEMIA, UNSPECIFIED HYPERLIPIDEMIA TYPE: Primary | ICD-10-CM

## 2025-06-17 NOTE — PROGRESS NOTES
"[facility] Pharmacy Consult  Rom Prince is a 75 y.o. male was referred to Clinical Pharmacy Team for a Pharmacy consult.  The patient was referred for their Hyperlipidemia.    Referring Provider: Maurisio Combs DO    Subjective   Allergies[1]    [id number] - [place], [place] - [address]  [address]  [facility] [address]  Phone: [phone] Fax: [phone]      HPI  HYPERLIPIDEMIA ASSESSMENT  Primary prevention; The 10-year ASCVD risk score (Katie ADKINS, et al., 2019) is: 32.6%    Values used to calculate the score:      Age: 75 years      Sex: Male      Is Non- : No      Diabetic: No      Tobacco smoker: No      Systolic Blood Pressure: 126 mmHg      Is BP treated: Yes      HDL Cholesterol: 39 mg/dL      Total Cholesterol: 190 mg/dL     LDL Goal: 122 mg/dL    Medication Therapy  Current medications include:  none  Clarifications to above regimen: none  Adverse Effects: bad cramps to statins  Previous Medications: had really bad cramps overnight  Rosuvastatin  Simvastatin    Hx of Afib    Adherence:  Patient-reported compliance with medication regimen and lifestyle modifications.    Additional Considerations:  Immunizations Needed: All up-to-date and documented  Tobacco Use: non-smoker  Review of Systems    Objective     There were no vitals taken for this visit.     LAB  Lab Results   Component Value Date    BILITOT 0.5 06/18/2025    CALCIUM 8.8 06/18/2025    CO2 26 06/18/2025     06/18/2025    CREATININE 0.91 06/18/2025    GLUCOSE 104 (H) 06/18/2025    ALKPHOS 63 06/18/2025    K 4.1 06/18/2025    PROT 6.5 06/18/2025     06/18/2025    AST 20 06/18/2025    ALT 19 06/18/2025    BUN 17 06/18/2025    ANIONGAP 8 06/18/2025    ALBUMIN 3.9 06/18/2025    GFRMALE 88 07/13/2023     Lab Results   Component Value Date    TRIG 120 06/18/2025    CHOL 190 06/18/2025    LDLCALC 128 (H) 06/18/2025    HDL 39 (L) 06/18/2025     No results found for: \"HGBA1C\"    Medications " Ordered Prior to Encounter[2]     Assessment/Plan   Problem List Items Addressed This Visit       Hyperlipemia - Primary    CONTINUE all meds as prescribed  Recommend Zetia 10mg po daily for HLD. Pt unable to tolerate statins. Will try Zetia, has a different mechanism of action and can often have a better safety profile from statins.   Educate on side effects with Zetia  Message sent to Pcp to start Zetia 10mg po daily  FUV in 4 weeks         Relevant Orders    Referral to Clinical Pharmacy        Continue all meds under the continuation of care with the referring provider and clinical pharmacy team.    Hi Prajapati PharmD     Verbal consent to manage patient's drug therapy was obtained from [the patient and/or an individual authorized to act on behalf of a patient]. They were informed they may decline to participate or withdraw from participation in pharmacy services at any time.         [1]   Allergies  Allergen Reactions    Famotidine Other    Rosuvastatin Other    Simvastatin Other    Codeine Anxiety and Unknown     Nausea   [2]   Current Outpatient Medications on File Prior to Visit   Medication Sig Dispense Refill    aspirin 81 mg chewable tablet Chew 1 tablet (81 mg) once daily at bedtime.      ergocalciferol (Vitamin D-2) 1250 mcg (50,000 units) capsule Take 1 capsule (50,000 Units) by mouth 1 (one) time per week. 12 capsule 2    fluticasone (Flonase Allergy Relief) 50 mcg/actuation nasal spray Administer 1 spray into each nostril once daily. 16 g 11    lisinopriL-hydrochlorothiazide 20-12.5 mg tablet Take 1 tablet by mouth once daily. 90 tablet 2    loratadine (Claritin) 10 mg tablet Take 1 tablet (10 mg) by mouth once daily. 90 tablet 2    multivitamin tablet Take 1 tablet by mouth once daily.      omega-3 fatty acids-fish oil 300-1,000 mg capsule Take 1 tablet by mouth once daily at bedtime.      omeprazole OTC (PriLOSEC OTC) 20 mg EC tablet Take 1 tablet (20 mg) by mouth once daily in the morning. Take  before meals.      sotalol (Betapace) 120 mg tablet Take 1 tablet (120 mg) by mouth every 12 hours. 180 tablet 2    tamsulosin (Flomax) 0.4 mg 24 hr capsule Take 2 capsules (0.8 mg) by mouth once daily. 180 capsule 2     No current facility-administered medications on file prior to visit.

## 2025-06-17 NOTE — Clinical Note
Hi Dr. Combs,  I want to recommend Zetia 10mg po daily for this patient. Its often a second choice in HLD management if pt is intolerance to Statins. We can then do a PA for Repatha or Zetia is failed. Thank you!  Hi Prajapati, Pharm D

## 2025-06-18 LAB
25(OH)D3+25(OH)D2 SERPL-MCNC: 83 NG/ML (ref 30–100)
ALBUMIN SERPL-MCNC: 3.9 G/DL (ref 3.6–5.1)
ALP SERPL-CCNC: 63 U/L (ref 35–144)
ALT SERPL-CCNC: 19 U/L (ref 9–46)
ANION GAP SERPL CALCULATED.4IONS-SCNC: 8 MMOL/L (CALC) (ref 7–17)
APPEARANCE UR: CLEAR
AST SERPL-CCNC: 20 U/L (ref 10–35)
BILIRUB SERPL-MCNC: 0.5 MG/DL (ref 0.2–1.2)
BILIRUB UR QL STRIP: NEGATIVE
BUN SERPL-MCNC: 17 MG/DL (ref 7–25)
CALCIUM SERPL-MCNC: 8.8 MG/DL (ref 8.6–10.3)
CHLORIDE SERPL-SCNC: 105 MMOL/L (ref 98–110)
CHOLEST SERPL-MCNC: 190 MG/DL
CHOLEST/HDLC SERPL: 4.9 (CALC)
CO2 SERPL-SCNC: 26 MMOL/L (ref 20–32)
COLOR UR: YELLOW
CREAT SERPL-MCNC: 0.91 MG/DL (ref 0.7–1.28)
EGFRCR SERPLBLD CKD-EPI 2021: 87 ML/MIN/1.73M2
ERYTHROCYTE [DISTWIDTH] IN BLOOD BY AUTOMATED COUNT: 13.2 % (ref 11–15)
GLUCOSE SERPL-MCNC: 104 MG/DL (ref 65–99)
GLUCOSE UR QL STRIP: NEGATIVE
HCT VFR BLD AUTO: 44.3 % (ref 38.5–50)
HDLC SERPL-MCNC: 39 MG/DL
HGB BLD-MCNC: 15 G/DL (ref 13.2–17.1)
HGB UR QL STRIP: NEGATIVE
KETONES UR QL STRIP: NEGATIVE
LDLC SERPL CALC-MCNC: 128 MG/DL (CALC)
LEUKOCYTE ESTERASE UR QL STRIP: NEGATIVE
MCH RBC QN AUTO: 30.4 PG (ref 27–33)
MCHC RBC AUTO-ENTMCNC: 33.9 G/DL (ref 32–36)
MCV RBC AUTO: 89.7 FL (ref 80–100)
NITRITE UR QL STRIP: NEGATIVE
NONHDLC SERPL-MCNC: 151 MG/DL (CALC)
PH UR STRIP: 6 [PH] (ref 5–8)
PLATELET # BLD AUTO: 157 THOUSAND/UL (ref 140–400)
PMV BLD REES-ECKER: 9.6 FL (ref 7.5–12.5)
POTASSIUM SERPL-SCNC: 4.1 MMOL/L (ref 3.5–5.3)
PROT SERPL-MCNC: 6.5 G/DL (ref 6.1–8.1)
PROT UR QL STRIP: NEGATIVE
PSA SERPL-MCNC: 1.02 NG/ML
RBC # BLD AUTO: 4.94 MILLION/UL (ref 4.2–5.8)
SODIUM SERPL-SCNC: 139 MMOL/L (ref 135–146)
SP GR UR STRIP: 1.01 (ref 1–1.03)
TESTOST SERPL-MCNC: 475 NG/DL (ref 250–827)
TRIGL SERPL-MCNC: 120 MG/DL
TSH SERPL-ACNC: 3.17 MIU/L (ref 0.4–4.5)
WBC # BLD AUTO: 4.8 THOUSAND/UL (ref 3.8–10.8)

## 2025-06-20 ENCOUNTER — APPOINTMENT (OUTPATIENT)
Dept: NEUROLOGY | Facility: CLINIC | Age: 75
End: 2025-06-20
Payer: MEDICARE

## 2025-06-20 VITALS
DIASTOLIC BLOOD PRESSURE: 82 MMHG | HEIGHT: 67 IN | BODY MASS INDEX: 29.82 KG/M2 | SYSTOLIC BLOOD PRESSURE: 126 MMHG | HEART RATE: 78 BPM | RESPIRATION RATE: 17 BRPM | TEMPERATURE: 97.3 F | WEIGHT: 190 LBS

## 2025-06-20 DIAGNOSIS — H53.9 VISUAL AURA: Primary | ICD-10-CM

## 2025-06-20 PROCEDURE — 3079F DIAST BP 80-89 MM HG: CPT

## 2025-06-20 PROCEDURE — 99214 OFFICE O/P EST MOD 30 MIN: CPT

## 2025-06-20 PROCEDURE — 1126F AMNT PAIN NOTED NONE PRSNT: CPT

## 2025-06-20 PROCEDURE — 3074F SYST BP LT 130 MM HG: CPT

## 2025-06-20 PROCEDURE — 1159F MED LIST DOCD IN RCRD: CPT

## 2025-06-20 ASSESSMENT — PAIN SCALES - GENERAL: PAINLEVEL_OUTOF10: 0-NO PAIN

## 2025-06-20 ASSESSMENT — ENCOUNTER SYMPTOMS
LOSS OF SENSATION IN FEET: 1
OCCASIONAL FEELINGS OF UNSTEADINESS: 0
DEPRESSION: 0

## 2025-06-20 ASSESSMENT — PATIENT HEALTH QUESTIONNAIRE - PHQ9
SUM OF ALL RESPONSES TO PHQ9 QUESTIONS 1 AND 2: 0
2. FEELING DOWN, DEPRESSED OR HOPELESS: NOT AT ALL
1. LITTLE INTEREST OR PLEASURE IN DOING THINGS: NOT AT ALL

## 2025-06-20 NOTE — ASSESSMENT & PLAN NOTE
CONTINUE all meds as prescribed  Recommend Zetia 10mg po daily for HLD. Pt unable to tolerate statins. Will try Zetia, has a different mechanism of action and can often have a better safety profile from statins.   Educate on side effects with Zetia  Message sent to Pcp to start Zetia 10mg po daily  FUV in 4 weeks

## 2025-06-20 NOTE — PROGRESS NOTES
"Chief Complaint   Patient presents with    Follow-up     Subjective   HPI  Rom Prince is a 75 y.o. year old male who presents with chief complaint Visual aura [H53.9]. PMH significant for ocular migraines,visual disturbance, bilateral hearing loss, seasonal allergies, KARAN (CPAP) neck pain, hyperlipidemia, PVCs, Afib (successful cardiac ablation)chest pain, HTN, DJD, BPH, and IBS.    Since his initial evaluation, Rom has kept a detailed log of visual disturbances, including the time of day. The auras occur randomly anytime of day or night, and can occur while at rest, or while exercising. He has not been able to pinpoint any specific triggers for the visual auras. The visual changes are brief, lasting 15 to 20 minutes, and then fully remit. They continue to have no head pain following, and are not accompanied by any neurological deficits.     An evaluation and review of imaging by Dr. Paul/ neuro ophthalmology noted age-related changes vision/ eye health changes and paranasal sinus disease. There are no concerns warranting follow-up.     Recall:  Rom started experiencing visual disturbances at age 72 (2022). Had almost no occurrences in 2023, but have started to occur more frequently in 2024.  When the visual disturbances are present, they occur 2 to 4 times per month.   Triggers include bright lights. (Opening the front door, the sun/ bright light was especially intense).   Visual auras come on gradually. Usually are in the upper left visual field. More recently, had the same symptoms in the upper right visual field. The duration is usually 15-20 minutes. The symptoms then fade over a minute or 2 and completely subside. The symptoms are not followed by any head pain or headache.   The visual disturbance he experiences is a \"C\" shape of zig-zag lines or interlocking triangles that appear to be stationary, sometimes drifting. The lines have a reflective appearance and \"shimmer\" within the visual field. The " visual disturbance is usually black and white, but sometimes has colors mixed in. The changes remain the same whether the eyes are open or closed.  No associated photophobia/phonophobia, or nausea. Also denies the presence of numbness/tingling, vestibular symptoms, heart palpitations, or changes in blood pressure. There is no presence of Dysgeusia,or parosmias, no associated excessive lacrimation or rhinorrhea. No expressive or receptive aphasia or other changes in speech, no pause in movement.   Caffeine: one 6oz cup of coffee which he mixes with 8oz decaffeinated.  No recent sinus infections (last was 1 year ago).  Endorses regular vision checkups. Last ophthalmologist visit a few weeks ago (with dilation).  Regular dental checkups.  Denies the presence of anxiety/depression.  Positive family history of migraine (mother).  Previous MRI brain in 2022 (without contrast).    Endorses tension headaches and sinus headaches that are intermittent, and are unrelated to the visual disturbances. No noted correlation to weather changes. Does endorse nasal congestion and rhinorrhea in the morning- uses Flonase somewhat regularly.    Medications  Preventive: None  *history of kidney stones  Rescue: None    Current Outpatient Medications:     aspirin 81 mg chewable tablet, Chew 1 tablet (81 mg) once daily at bedtime., Disp: , Rfl:     ergocalciferol (Vitamin D-2) 1250 mcg (50,000 units) capsule, Take 1 capsule (50,000 Units) by mouth 1 (one) time per week., Disp: 12 capsule, Rfl: 2    fluticasone (Flonase Allergy Relief) 50 mcg/actuation nasal spray, Administer 1 spray into each nostril once daily., Disp: 16 g, Rfl: 11    lisinopriL-hydrochlorothiazide 20-12.5 mg tablet, Take 1 tablet by mouth once daily., Disp: 90 tablet, Rfl: 2    loratadine (Claritin) 10 mg tablet, Take 1 tablet (10 mg) by mouth once daily., Disp: 90 tablet, Rfl: 2    multivitamin tablet, Take 1 tablet by mouth once daily., Disp: , Rfl:     omega-3 fatty  "acids-fish oil 300-1,000 mg capsule, Take 1 tablet by mouth once daily at bedtime., Disp: , Rfl:     omeprazole OTC (PriLOSEC OTC) 20 mg EC tablet, Take 1 tablet (20 mg) by mouth once daily in the morning. Take before meals., Disp: , Rfl:     sotalol (Betapace) 120 mg tablet, Take 1 tablet (120 mg) by mouth every 12 hours., Disp: 180 tablet, Rfl: 2    tamsulosin (Flomax) 0.4 mg 24 hr capsule, Take 2 capsules (0.8 mg) by mouth once daily., Disp: 180 capsule, Rfl: 2    Allergies   Allergen Reactions    Famotidine Other    Rosuvastatin Other    Simvastatin Other    Codeine Anxiety and Unknown     Nausea     Social History     Tobacco Use    Smoking status: Never     Passive exposure: Never    Smokeless tobacco: Never   Substance Use Topics    Alcohol use: Yes     Alcohol/week: 14.0 standard drinks of alcohol     Types: 14 Glasses of wine per week     Social History     Substance and Sexual Activity   Drug Use Never      ROS  As noted in HPI, otherwise all other systems have been reviewed are negative for complaint.     Objective   General Appearance: Rom is well-developed, well-nourished, 75 y.o. year old male, in no acute distress. Makes good eye contact, is alert, interactive, and cooperative. Demonstrates recent & remote memory recall. Subjective information consistent with objective assessment.     Vitals:    06/20/25 1256   BP: 126/82   Pulse: 78   Resp: 17   Temp: 36.3 °C (97.3 °F)   TempSrc: Temporal   Weight: 86.2 kg (190 lb)   Height: 1.702 m (5' 7\")   PainSc: 0-No pain   PainLoc: Head       Lab Results   Component Value Date    WBC 4.8 06/18/2025    RBC 4.94 06/18/2025    HGB 15.0 06/18/2025    HCT 44.3 06/18/2025     06/18/2025     06/18/2025    K 4.1 06/18/2025     06/18/2025    BUN 17 06/18/2025    CREATININE 0.91 06/18/2025    EGFR 87 06/18/2025    CALCIUM 8.8 06/18/2025    ALKPHOS 63 06/18/2025    AST 20 06/18/2025    ALT 19 06/18/2025    VITD25 83 06/18/2025    LDLCALC 128 (H) " 06/18/2025    CHOL 190 06/18/2025    HDL 39 (L) 06/18/2025    TRIG 120 06/18/2025    TSH 3.17 06/18/2025      Neurological Exam  CRANIAL NERVES:   CN III, IV, VI: Extraocular movements intact bilaterally. Normal lids and orbits bilaterally.  CN VII: Full and symmetric facial movement.  CN VIII: Hearing is normal.    RECORDS REVIEW:  Previous records (provider notes, laboratory reports, and imaging studies were reviewed and summarized).    MR brain wo IV contrast  Result Date: 1/13/2025  1. No evidence of acute intracranial hemorrhage, mass, or mass effect. 2. 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 3. Evidence for chronic but stable ischemic microvascular changes of the white matter. 4. Stable multi-cystic appearance of the parotid glands.   I personally reviewed the images/study and I agree with the findings as stated. This study was interpreted at [place], [place].   MACRO: None   Signed by: Aileen Cullen 1/13/2025 11:30 AM Dictation workstation:   [id number]      11/15/2022 MR brain wo IV contrast  Status: Final result     PACS Images     Show images for MR brain wo IV contrast  Signed by    Signed Time Phone Pager   Davina Cruz DO 11/15/2022 12:14 [phone]     Exam Information    Status Exam Begun Exam Ended   Final 11/15/2022 10:26 11/15/2022 10:48     Study Result    Narrative & Impression   MRN: [id number]  Patient Name: JAMSHID PELAYO     STUDY:  MRI BRAIN WO;  11/15/2022 10:48 am     INDICATION:  Ocular pain; Visual disturbance  H53.9: Visual disturbance H57.10:  Ocular pain.     COMPARISON:  CT head, 05/03/2021.     ACCESSION NUMBER(S):  [id number]     ORDERING CLINICIAN:  DELIA ANDRE     TECHNIQUE:  Axial T2, FLAIR, DWI, gradient echo T2 and sagittal and coronal T1  weighted images of brain were acquired.     FINDINGS:  CSF  Spaces: The ventricles, sulci and basal cisterns are within  normal limits. No abnormal extra-axial fluid collection.     Parenchyma: There is no diffusion restriction abnormality to suggest  acute infarct.  There are T2/FLAIR hyperintense foci scattered  throughout the periventricular and subcortical white matter of the  bilateral cerebral hemispheres which likely represent the sequela of  chronic small vessel ischemic disease given patient's age. There is  no mass effect or midline shift. No abnormal hemosiderin deposition.     Paranasal Sinuses and Mastoids: There are postoperative changes from  partial ethmoidectomies, turbinectomies, medial antrectomies, and  partial resection of the nasal septum. There is extensive polypoid  material and mucosal thickening throughout residual maxillary sinuses  with partial to complete opacification of several residual ethmoid  air cells, left greater than right. There is opacification of the  right sphenoid sinus. Some of the material is hypointense on T2 and  intermediate to hyperintense on T1 weighted imaging which is  nonspecific but may be seen with noninvasive fungal elements,  desiccated secretions, blood products, among others. There are  polypoid foci within the nasal cavities. There is partial  opacification of a few mastoid air cells.     Other: The bilateral parotid glands are enlarged and demonstrate  numerous T1 hypointense and T2/FLAIR hyperintense lesions. The prior  CT head demonstrated fatty infiltration of the parotid glands.     IMPRESSION:  1. No acute intracranial process.  2. Nonspecific white matter changes most likely represent  small-vessel ischemic disease in a patient of this age.  3. Extensive postoperative and inflammatory changes of the paranasal  sinuses and nasal cavities with polypoid foci suggesting sinonasal  polyposis. Correlation with direct visualization is recommended.  4. Nonspecific bilateral enlargement and nodularity of the  parotid  glands. The differential diagnosis includes benign lymphoepithelial  lesions, Sjogren syndrome, among others.     11/10/2022 Vascular US Carotid Artery Duplex Bilateral    Indications  Priority: Routine  Signs/Symptoms: Ocular pain; Visual disturbance  H57.10: Ocular pain   Dx: Ocular pain, unspecified eye [H57.10 (ICD-10-CM)]     PACS Images   Show images for Vascular US carotid artery duplex bilateral  Interpretation Summary    MRN: [id number]  Patient Name: JAMSHID PELAYO     STUDY:  US CAROTID BILATERAL DUPLEX;  11/10/2022 10:49 am     INDICATION:  Ocular pain; Visual disturbance  H57.10: Ocular pain.     COMPARISON:  None.     ACCESSION NUMBER(S):  [id number]     ORDERING CLINICIAN:  DELIA ANDRE     TECHNIQUE:  Grayscale, color and spectral Doppler ultrasound evaluation of the  bilateral carotid system.     FINDINGS:  RIGHT:  Grayscale ultrasound reveals  minimal atherosclerotic plaque at the  origin of the right internal carotid artery  extending from the  common carotid artery. There is no narrowing by color Doppler. There  are no significantly elevated velocities in the internal carotid  artery.     RIGHT SIDE PEAK SYSTOLIC VELOCITY:  Peak proximal systolic ICA velocity on the right is  67 cm/s.     Right common and external carotid waveforms are within normal limits.     RIGHT VERTEBRAL ARTERY:  The right vertebral artery demonstrates proximal normal anterograde  flow.     LEFT:  Grayscale ultrasound reveals  minimal atherosclerotic plaque at the  origin of the left internal carotid artery  extending from the common  carotid artery. There is no narrowing by color Doppler. There are no  significantly elevated velocities in the internal carotid artery.     LEFT SIDE PEAK SYSTOLIC VELOCITY:  Peak proximal systolic ICA velocity on the left is  100 cm/s.     Left common and external carotid waveforms are within normal limits.     LEFT VERTEBRAL ARTERY:  The left vertebral artery demonstrates proximal normal  anterograde  flow.     IMPRESSION:  Minimal bilateral carotid plaque; no stenosis greater than 50%.     The velocity criteria are extrapolated from diameter data as defined  by the Society of Radiologists in Ultrasound Consensus Conference  Radiology 2003; 229;340-346.  Assessment & Plan  Visual aura          ASSESSMENT/PLAN:  Follow up with primary care, otherwise, follow up as needed    I personally spent 35 minutes today, exclusive of procedures, providing care for this patient, including preparation, face to face time, documentation and other services such as review of medical records, diagnostic result, patient education, counseling, coordination of care as specified in the encounter.       Davina Winn, APRN-CNP

## 2025-06-20 NOTE — PATIENT INSTRUCTIONS
Dear Rom,    Thank you for coming to [facility]. It was a pleasure caring for you today.  The best way to contact me for medication refills or questions about your care is through [employer].  Otherwise, you can contact the office by phone: [phone].    YOLANDA Granados-CNP    PLAN:  Follow up with primary care. Otherwise, follow up as needed

## 2025-07-08 ENCOUNTER — APPOINTMENT (OUTPATIENT)
Dept: PHARMACY | Facility: HOSPITAL | Age: 75
End: 2025-07-08
Payer: COMMERCIAL

## 2025-07-08 DIAGNOSIS — E78.5 HYPERLIPIDEMIA, UNSPECIFIED HYPERLIPIDEMIA TYPE: Primary | ICD-10-CM

## 2025-07-08 RX ORDER — EZETIMIBE 10 MG/1
10 TABLET ORAL DAILY
Qty: 30 TABLET | Refills: 5 | Status: SHIPPED | OUTPATIENT
Start: 2025-07-08 | End: 2026-01-04

## 2025-07-29 ENCOUNTER — APPOINTMENT (OUTPATIENT)
Dept: PHARMACY | Facility: HOSPITAL | Age: 75
End: 2025-07-29
Payer: COMMERCIAL

## 2025-07-29 DIAGNOSIS — E78.5 HYPERLIPIDEMIA, UNSPECIFIED HYPERLIPIDEMIA TYPE: Primary | ICD-10-CM

## 2025-07-29 NOTE — PROGRESS NOTES
[facility] Pharmacy Consult  Rom Prince is a 75 y.o. male was referred to Clinical Pharmacy Team for a Pharmacy consult.  The patient was referred for their Hyperlipidemia.    Referring Provider: Maurisio Combs DO    Subjective   Allergies[1]    [id number] - [place], [place] - [address]  [id number]  [facility] [address]  Phone: [phone] Fax: [phone]      HPI  HYPERLIPIDEMIA ASSESSMENT  Primary prevention; The 10-year ASCVD risk score (Katie ADKINS, et al., 2019) is: 32.6%    Values used to calculate the score:      Age: 75 years      Sex: Male      Is Non- : No      Diabetic: No      Tobacco smoker: No      Systolic Blood Pressure: 126 mmHg      Is BP treated: Yes      HDL Cholesterol: 39 mg/dL      Total Cholesterol: 190 mg/dL     Denies side effect with new start of Zetia     LDL Goal: 122 mg/dL     Medication Therapy  Current medications include:  Zetia 10mg po daily  Clarifications to above regimen: none  Adverse Effects: bad cramps to statins  Previous Medications: had really bad cramps overnight  Rosuvastatin  Simvastatin     Hx of Afib     Adherence:  Patient-reported compliance with medication regimen and lifestyle modifications.     Additional Considerations:  Immunizations Needed: All up-to-date and documented  Tobacco Use: non-smoker  Review of Systems    Objective     There were no vitals taken for this visit.     LAB  Lab Results   Component Value Date    BILITOT 0.5 06/18/2025    CALCIUM 8.8 06/18/2025    CO2 26 06/18/2025     06/18/2025    CREATININE 0.91 06/18/2025    GLUCOSE 104 (H) 06/18/2025    ALKPHOS 63 06/18/2025    K 4.1 06/18/2025    PROT 6.5 06/18/2025     06/18/2025    AST 20 06/18/2025    ALT 19 06/18/2025    BUN 17 06/18/2025    ANIONGAP 8 06/18/2025    ALBUMIN 3.9 06/18/2025    GFRMALE 88 07/13/2023     Lab Results   Component Value Date    TRIG 120 06/18/2025    CHOL 190 06/18/2025    LDLCALC 128 (H) 06/18/2025    HDL 39  "(L) 06/18/2025     No results found for: \"HGBA1C\"    Medications Ordered Prior to Encounter[2]       Assessment/Plan   Problem List Items Addressed This Visit       Hyperlipemia - Primary    CONTINUE all meds as prescribed  HLD uncontrolled but improving  Educate on side effects  FUV in 3 months to assess lipid panels         Relevant Orders    Referral to Clinical Pharmacy        Continue all meds under the continuation of care with the referring provider and clinical pharmacy team.    Hi Prajapati PharmD     Verbal consent to manage patient's drug therapy was obtained from [the patient and/or an individual authorized to act on behalf of a patient]. They were informed they may decline to participate or withdraw from participation in pharmacy services at any time.         [1]   Allergies  Allergen Reactions    Famotidine Other    Rosuvastatin Other    Simvastatin Other    Codeine Anxiety and Unknown     Nausea   [2]   Current Outpatient Medications on File Prior to Visit   Medication Sig Dispense Refill    aspirin 81 mg chewable tablet Chew 1 tablet (81 mg) once daily at bedtime.      ergocalciferol (Vitamin D-2) 1250 mcg (50,000 units) capsule Take 1 capsule (50,000 Units) by mouth 1 (one) time per week. 12 capsule 2    ezetimibe (Zetia) 10 mg tablet Take 1 tablet (10 mg) by mouth once daily. 30 tablet 5    fluticasone (Flonase Allergy Relief) 50 mcg/actuation nasal spray Administer 1 spray into each nostril once daily. 16 g 11    lisinopriL-hydrochlorothiazide 20-12.5 mg tablet Take 1 tablet by mouth once daily. 90 tablet 2    loratadine (Claritin) 10 mg tablet Take 1 tablet (10 mg) by mouth once daily. 90 tablet 2    multivitamin tablet Take 1 tablet by mouth once daily.      omega-3 fatty acids-fish oil 300-1,000 mg capsule Take 1 tablet by mouth once daily at bedtime.      omeprazole OTC (PriLOSEC OTC) 20 mg EC tablet Take 1 tablet (20 mg) by mouth once daily in the morning. Take before meals.      sotalol " (Betapace) 120 mg tablet Take 1 tablet (120 mg) by mouth every 12 hours. 180 tablet 2    tamsulosin (Flomax) 0.4 mg 24 hr capsule Take 2 capsules (0.8 mg) by mouth once daily. 180 capsule 2     No current facility-administered medications on file prior to visit.

## 2025-08-01 NOTE — ASSESSMENT & PLAN NOTE
CONTINUE all meds as prescribed  HLD uncontrolled but improving  Educate on side effects  FUV in 3 months to assess lipid panels  
No

## 2025-12-12 ENCOUNTER — APPOINTMENT (OUTPATIENT)
Dept: PRIMARY CARE | Facility: CLINIC | Age: 75
End: 2025-12-12
Payer: MEDICARE

## 2025-12-16 ENCOUNTER — APPOINTMENT (OUTPATIENT)
Dept: PHARMACY | Facility: HOSPITAL | Age: 75
End: 2025-12-16
Payer: COMMERCIAL